# Patient Record
Sex: MALE | Race: WHITE
[De-identification: names, ages, dates, MRNs, and addresses within clinical notes are randomized per-mention and may not be internally consistent; named-entity substitution may affect disease eponyms.]

---

## 2021-06-12 ENCOUNTER — HOSPITAL ENCOUNTER (EMERGENCY)
Dept: HOSPITAL 79 - ER1 | Age: 56
Discharge: HOME | End: 2021-06-12
Payer: MEDICARE

## 2021-06-12 DIAGNOSIS — D72.829: ICD-10-CM

## 2021-06-12 DIAGNOSIS — K80.20: Primary | ICD-10-CM

## 2021-06-12 DIAGNOSIS — Z20.822: ICD-10-CM

## 2021-06-12 LAB
BUN/CREATININE RATIO: 29 (ref 0–10)
HGB BLD-MCNC: 15.6 GM/DL (ref 14–17.5)
RED BLOOD COUNT: 4.97 M/UL (ref 4.2–5.5)
WHITE BLOOD COUNT: 16 K/UL (ref 4.5–11)

## 2021-06-12 PROCEDURE — U0002 COVID-19 LAB TEST NON-CDC: HCPCS

## 2021-08-15 ENCOUNTER — HOSPITAL ENCOUNTER (EMERGENCY)
Dept: HOSPITAL 79 - ER1 | Age: 56
Discharge: LEFT BEFORE DISCHARGE | End: 2021-08-15
Payer: MEDICARE

## 2021-08-15 DIAGNOSIS — Z53.21: Primary | ICD-10-CM

## 2023-03-30 ENCOUNTER — APPOINTMENT (OUTPATIENT)
Dept: CT IMAGING | Facility: HOSPITAL | Age: 58
End: 2023-03-30
Payer: MEDICARE

## 2023-03-30 ENCOUNTER — HOSPITAL ENCOUNTER (EMERGENCY)
Facility: HOSPITAL | Age: 58
Discharge: HOME | End: 2023-03-30
Attending: EMERGENCY MEDICINE | Admitting: EMERGENCY MEDICINE
Payer: MEDICARE

## 2023-03-30 VITALS
BODY MASS INDEX: 27.92 KG/M2 | WEIGHT: 195 LBS | OXYGEN SATURATION: 99 % | HEART RATE: 57 BPM | DIASTOLIC BLOOD PRESSURE: 77 MMHG | SYSTOLIC BLOOD PRESSURE: 111 MMHG | TEMPERATURE: 98.4 F | RESPIRATION RATE: 16 BRPM | HEIGHT: 70 IN

## 2023-03-30 DIAGNOSIS — A08.0 ROTAVIRUS ENTERITIS: Primary | ICD-10-CM

## 2023-03-30 LAB
027 TOXIN: NORMAL
ADV 40+41 DNA STL QL NAA+NON-PROBE: NOT DETECTED
ALBUMIN SERPL-MCNC: 4.2 G/DL (ref 3.5–5.2)
ALBUMIN/GLOB SERPL: 1.3 G/DL
ALP SERPL-CCNC: 65 U/L (ref 39–117)
ALT SERPL W P-5'-P-CCNC: 29 U/L (ref 1–41)
AMYLASE SERPL-CCNC: 20 U/L (ref 28–100)
ANION GAP SERPL CALCULATED.3IONS-SCNC: 12.6 MMOL/L (ref 5–15)
AST SERPL-CCNC: 30 U/L (ref 1–40)
ASTRO TYP 1-8 RNA STL QL NAA+NON-PROBE: NOT DETECTED
BASOPHILS # BLD AUTO: 0.01 10*3/MM3 (ref 0–0.2)
BASOPHILS NFR BLD AUTO: 0.2 % (ref 0–1.5)
BILIRUB SERPL-MCNC: 0.5 MG/DL (ref 0–1.2)
BILIRUB UR QL STRIP: NEGATIVE
BUN SERPL-MCNC: 23 MG/DL (ref 6–20)
BUN/CREAT SERPL: 25 (ref 7–25)
C CAYETANENSIS DNA STL QL NAA+NON-PROBE: NOT DETECTED
C COLI+JEJ+UPSA DNA STL QL NAA+NON-PROBE: NOT DETECTED
C DIFF TOX GENS STL QL NAA+PROBE: NEGATIVE
CALCIUM SPEC-SCNC: 9.1 MG/DL (ref 8.6–10.5)
CHLORIDE SERPL-SCNC: 103 MMOL/L (ref 98–107)
CLARITY UR: CLEAR
CO2 SERPL-SCNC: 19.4 MMOL/L (ref 22–29)
COLOR UR: ABNORMAL
CREAT SERPL-MCNC: 0.92 MG/DL (ref 0.76–1.27)
CRP SERPL-MCNC: 0.85 MG/DL (ref 0–0.5)
CRYPTOSP DNA STL QL NAA+NON-PROBE: NOT DETECTED
DEPRECATED RDW RBC AUTO: 42.4 FL (ref 37–54)
E HISTOLYT DNA STL QL NAA+NON-PROBE: NOT DETECTED
EAEC PAA PLAS AGGR+AATA ST NAA+NON-PRB: NOT DETECTED
EC STX1+STX2 GENES STL QL NAA+NON-PROBE: NOT DETECTED
EGFRCR SERPLBLD CKD-EPI 2021: 97.6 ML/MIN/1.73
EOSINOPHIL # BLD AUTO: 0.07 10*3/MM3 (ref 0–0.4)
EOSINOPHIL NFR BLD AUTO: 1.1 % (ref 0.3–6.2)
EPEC EAE GENE STL QL NAA+NON-PROBE: NOT DETECTED
ERYTHROCYTE [DISTWIDTH] IN BLOOD BY AUTOMATED COUNT: 12.9 % (ref 12.3–15.4)
ERYTHROCYTE [SEDIMENTATION RATE] IN BLOOD: 23 MM/HR (ref 0–20)
ETEC LTA+ST1A+ST1B TOX ST NAA+NON-PROBE: NOT DETECTED
G LAMBLIA DNA STL QL NAA+NON-PROBE: NOT DETECTED
GLOBULIN UR ELPH-MCNC: 3.2 GM/DL
GLUCOSE SERPL-MCNC: 93 MG/DL (ref 65–99)
GLUCOSE UR STRIP-MCNC: NEGATIVE MG/DL
HCT VFR BLD AUTO: 48.7 % (ref 37.5–51)
HGB BLD-MCNC: 16.2 G/DL (ref 13–17.7)
HGB UR QL STRIP.AUTO: NEGATIVE
HOLD SPECIMEN: NORMAL
HOLD SPECIMEN: NORMAL
IMM GRANULOCYTES # BLD AUTO: 0.02 10*3/MM3 (ref 0–0.05)
IMM GRANULOCYTES NFR BLD AUTO: 0.3 % (ref 0–0.5)
KETONES UR QL STRIP: NEGATIVE
LEUKOCYTE ESTERASE UR QL STRIP.AUTO: NEGATIVE
LIPASE SERPL-CCNC: 17 U/L (ref 13–60)
LYMPHOCYTES # BLD AUTO: 0.58 10*3/MM3 (ref 0.7–3.1)
LYMPHOCYTES NFR BLD AUTO: 9.5 % (ref 19.6–45.3)
MAGNESIUM SERPL-MCNC: 2.2 MG/DL (ref 1.6–2.6)
MCH RBC QN AUTO: 29.9 PG (ref 26.6–33)
MCHC RBC AUTO-ENTMCNC: 33.3 G/DL (ref 31.5–35.7)
MCV RBC AUTO: 89.9 FL (ref 79–97)
MONOCYTES # BLD AUTO: 0.73 10*3/MM3 (ref 0.1–0.9)
MONOCYTES NFR BLD AUTO: 11.9 % (ref 5–12)
NEUTROPHILS NFR BLD AUTO: 4.71 10*3/MM3 (ref 1.7–7)
NEUTROPHILS NFR BLD AUTO: 77 % (ref 42.7–76)
NITRITE UR QL STRIP: NEGATIVE
NOROVIRUS GI+II RNA STL QL NAA+NON-PROBE: NOT DETECTED
NRBC BLD AUTO-RTO: 0 /100 WBC (ref 0–0.2)
P SHIGELLOIDES DNA STL QL NAA+NON-PROBE: NOT DETECTED
PH UR STRIP.AUTO: 5.5 [PH] (ref 5–8)
PLATELET # BLD AUTO: 244 10*3/MM3 (ref 140–450)
PMV BLD AUTO: 9.6 FL (ref 6–12)
POTASSIUM SERPL-SCNC: 3.4 MMOL/L (ref 3.5–5.2)
PROCALCITONIN SERPL-MCNC: 0.17 NG/ML (ref 0–0.25)
PROT SERPL-MCNC: 7.4 G/DL (ref 6–8.5)
PROT UR QL STRIP: ABNORMAL
RBC # BLD AUTO: 5.42 10*6/MM3 (ref 4.14–5.8)
RVA RNA STL QL NAA+NON-PROBE: DETECTED
S ENT+BONG DNA STL QL NAA+NON-PROBE: NOT DETECTED
SAPO I+II+IV+V RNA STL QL NAA+NON-PROBE: NOT DETECTED
SHIGELLA SP+EIEC IPAH ST NAA+NON-PROBE: NOT DETECTED
SODIUM SERPL-SCNC: 135 MMOL/L (ref 136–145)
SP GR UR STRIP: 1.03 (ref 1–1.03)
UROBILINOGEN UR QL STRIP: ABNORMAL
V CHOL+PARA+VUL DNA STL QL NAA+NON-PROBE: NOT DETECTED
V CHOLERAE DNA STL QL NAA+NON-PROBE: NOT DETECTED
WBC NRBC COR # BLD: 6.12 10*3/MM3 (ref 3.4–10.8)
WHOLE BLOOD HOLD COAG: NORMAL
WHOLE BLOOD HOLD SPECIMEN: NORMAL
Y ENTEROCOL DNA STL QL NAA+NON-PROBE: NOT DETECTED

## 2023-03-30 PROCEDURE — 74177 CT ABD & PELVIS W/CONTRAST: CPT

## 2023-03-30 PROCEDURE — 86140 C-REACTIVE PROTEIN: CPT | Performed by: PHYSICIAN ASSISTANT

## 2023-03-30 PROCEDURE — 83690 ASSAY OF LIPASE: CPT | Performed by: PHYSICIAN ASSISTANT

## 2023-03-30 PROCEDURE — 82150 ASSAY OF AMYLASE: CPT | Performed by: PHYSICIAN ASSISTANT

## 2023-03-30 PROCEDURE — 85652 RBC SED RATE AUTOMATED: CPT | Performed by: PHYSICIAN ASSISTANT

## 2023-03-30 PROCEDURE — 87493 C DIFF AMPLIFIED PROBE: CPT | Performed by: PHYSICIAN ASSISTANT

## 2023-03-30 PROCEDURE — 84145 PROCALCITONIN (PCT): CPT | Performed by: PHYSICIAN ASSISTANT

## 2023-03-30 PROCEDURE — 25510000001 IOPAMIDOL 61 % SOLUTION: Performed by: EMERGENCY MEDICINE

## 2023-03-30 PROCEDURE — 81003 URINALYSIS AUTO W/O SCOPE: CPT | Performed by: PHYSICIAN ASSISTANT

## 2023-03-30 PROCEDURE — 87507 IADNA-DNA/RNA PROBE TQ 12-25: CPT | Performed by: PHYSICIAN ASSISTANT

## 2023-03-30 PROCEDURE — 83735 ASSAY OF MAGNESIUM: CPT | Performed by: PHYSICIAN ASSISTANT

## 2023-03-30 PROCEDURE — 96360 HYDRATION IV INFUSION INIT: CPT

## 2023-03-30 PROCEDURE — 85025 COMPLETE CBC W/AUTO DIFF WBC: CPT | Performed by: PHYSICIAN ASSISTANT

## 2023-03-30 PROCEDURE — 80053 COMPREHEN METABOLIC PANEL: CPT | Performed by: PHYSICIAN ASSISTANT

## 2023-03-30 PROCEDURE — 99284 EMERGENCY DEPT VISIT MOD MDM: CPT

## 2023-03-30 RX ORDER — POTASSIUM CHLORIDE 20 MEQ/1
40 TABLET, EXTENDED RELEASE ORAL ONCE
Status: COMPLETED | OUTPATIENT
Start: 2023-03-30 | End: 2023-03-30

## 2023-03-30 RX ORDER — SODIUM CHLORIDE 0.9 % (FLUSH) 0.9 %
10 SYRINGE (ML) INJECTION AS NEEDED
Status: DISCONTINUED | OUTPATIENT
Start: 2023-03-30 | End: 2023-03-30 | Stop reason: HOSPADM

## 2023-03-30 RX ADMIN — IOPAMIDOL 84 ML: 612 INJECTION, SOLUTION INTRAVENOUS at 17:37

## 2023-03-30 RX ADMIN — SODIUM CHLORIDE 2000 ML: 9 INJECTION, SOLUTION INTRAVENOUS at 15:18

## 2023-03-30 RX ADMIN — POTASSIUM CHLORIDE 40 MEQ: 20 TABLET, EXTENDED RELEASE ORAL at 17:06

## 2023-03-30 NOTE — Clinical Note
[facility] EMERGENCY DEPARTMENT  [address] [facility] [address]  Phone: [phone]    Macario Shukla was seen and treated in our emergency department on 3/30/2023.  He may return to work on 04/03/2023.         Thank you for choosing [facility].    Marge Lindsey, PA

## 2023-03-30 NOTE — DISCHARGE INSTRUCTIONS
Rest at home, make sure you are drinking plenty of fluids.  Follow the brat (bananas, rice, applesauce, toast) diet to help with diarrhea.  Return to the ED at any time if symptoms change or worsen

## 2023-03-30 NOTE — ED PROVIDER NOTES
Subjective   History of Present Illness  58-year-old male who presents to the ED today for nausea, vomiting and diarrhea.  His symptoms started about 3 days ago.  He states the vomiting is better but he is still having a lot of diarrhea.  He states he has tried Pedialyte and Gatorade but everything is going straight through him.  He states he feels dehydrated.  He has been using Zofran for the nausea and it is helping.  He denies any recent travel.  He denies any known sick contacts.  He denies any exposure to contaminated food or water.  He states he is having lower abdominal cramping and chills but denies any known fever.  He states he is typically healthy and does not take any daily medications.    History provided by:  Patient  Vomiting  The primary symptoms include abdominal pain, nausea, vomiting and diarrhea. Primary symptoms do not include fever. The illness began 3 to 5 days ago. The onset was sudden. The problem has not changed since onset.  The illness is also significant for chills and anorexia.       Review of Systems   Constitutional: Positive for appetite change and chills. Negative for fever.   HENT: Negative.    Eyes: Negative.    Respiratory: Negative.    Cardiovascular: Negative.    Gastrointestinal: Positive for abdominal pain, anorexia, diarrhea, nausea and vomiting.   Genitourinary: Negative.    Musculoskeletal: Negative.    Skin: Negative.    Neurological: Negative.    Psychiatric/Behavioral: Negative.    All other systems reviewed and are negative.      No past medical history on file.    No Known Allergies    No past surgical history on file.    No family history on file.    Social History     Socioeconomic History   • Marital status:            Objective   Physical Exam  Vitals and nursing note reviewed.   Constitutional:       General: He is not in acute distress.     Appearance: He is well-developed. He is not diaphoretic.   HENT:      Head: Normocephalic and atraumatic.   Eyes:       Extraocular Movements: Extraocular movements intact.      Pupils: Pupils are equal, round, and reactive to light.   Cardiovascular:      Rate and Rhythm: Normal rate and regular rhythm.      Heart sounds: Normal heart sounds.   Pulmonary:      Effort: Pulmonary effort is normal.      Breath sounds: Normal breath sounds.   Abdominal:      General: Bowel sounds are increased.      Palpations: Abdomen is soft.      Tenderness: There is no abdominal tenderness. There is no right CVA tenderness, left CVA tenderness, guarding or rebound.   Skin:     General: Skin is warm and dry.      Capillary Refill: Capillary refill takes less than 2 seconds.   Neurological:      General: No focal deficit present.      Mental Status: He is alert and oriented to person, place, and time.   Psychiatric:         Mood and Affect: Mood normal.         Procedures        Results for orders placed or performed during the hospital encounter of 03/30/23   Gastrointestinal Panel, PCR - Stool, Per Rectum    Specimen: Per Rectum; Stool   Result Value Ref Range    Campylobacter Not Detected Not Detected    Plesiomonas shigelloides Not Detected Not Detected    Salmonella Not Detected Not Detected    Vibrio Not Detected Not Detected    Vibrio cholerae Not Detected Not Detected    Yersinia enterocolitica Not Detected Not Detected    Enteroaggregative E. coli (EAEC) Not Detected Not Detected    Enteropathogenic E. coli (EPEC) Not Detected Not Detected    Enterotoxigenic E. coli (ETEC) lt/st Not Detected Not Detected    Shiga-like toxin-producing E. coli (STEC) stx1/stx2 Not Detected Not Detected    Shigella/Enteroinvasive E. coli (EIEC) Not Detected Not Detected    Cryptosporidium Not Detected Not Detected    Cyclospora cayetanensis Not Detected Not Detected    Entamoeba histolytica Not Detected Not Detected    Giardia lamblia Not Detected Not Detected    Adenovirus F40/41 Not Detected Not Detected    Astrovirus Not Detected Not Detected    Norovirus  GI/GII Not Detected Not Detected    Rotavirus A Detected (A) Not Detected    Sapovirus (I, II, IV or V) Not Detected Not Detected   Clostridioides difficile Toxin, PCR - Stool, Per Rectum    Specimen: Per Rectum; Stool   Result Value Ref Range    C. Difficile Toxins by PCR Negative Negative    027 Toxin Presumptive Negative    Comprehensive Metabolic Panel    Specimen: Blood   Result Value Ref Range    Glucose 93 65 - 99 mg/dL    BUN 23 (H) 6 - 20 mg/dL    Creatinine 0.92 0.76 - 1.27 mg/dL    Sodium 135 (L) 136 - 145 mmol/L    Potassium 3.4 (L) 3.5 - 5.2 mmol/L    Chloride 103 98 - 107 mmol/L    CO2 19.4 (L) 22.0 - 29.0 mmol/L    Calcium 9.1 8.6 - 10.5 mg/dL    Total Protein 7.4 6.0 - 8.5 g/dL    Albumin 4.2 3.5 - 5.2 g/dL    ALT (SGPT) 29 1 - 41 U/L    AST (SGOT) 30 1 - 40 U/L    Alkaline Phosphatase 65 39 - 117 U/L    Total Bilirubin 0.5 0.0 - 1.2 mg/dL    Globulin 3.2 gm/dL    A/G Ratio 1.3 g/dL    BUN/Creatinine Ratio 25.0 7.0 - 25.0    Anion Gap 12.6 5.0 - 15.0 mmol/L    eGFR 97.6 >60.0 mL/min/1.73   Lipase    Specimen: Blood   Result Value Ref Range    Lipase 17 13 - 60 U/L   Amylase    Specimen: Blood   Result Value Ref Range    Amylase 20 (L) 28 - 100 U/L   Urinalysis With Microscopic If Indicated (No Culture) - Urine, Clean Catch    Specimen: Urine, Clean Catch   Result Value Ref Range    Color, UA Dark Yellow (A) Yellow, Straw    Appearance, UA Clear Clear    pH, UA 5.5 5.0 - 8.0    Specific Gravity, UA 1.029 1.005 - 1.030    Glucose, UA Negative Negative    Ketones, UA Negative Negative    Bilirubin, UA Negative Negative    Blood, UA Negative Negative    Protein, UA Trace (A) Negative    Leuk Esterase, UA Negative Negative    Nitrite, UA Negative Negative    Urobilinogen, UA 0.2 E.U./dL 0.2 - 1.0 E.U./dL   Sedimentation Rate    Specimen: Blood   Result Value Ref Range    Sed Rate 23 (H) 0 - 20 mm/hr   C-reactive Protein    Specimen: Blood   Result Value Ref Range    C-Reactive Protein 0.85 (H) 0.00 - 0.50  mg/dL   Procalcitonin    Specimen: Blood   Result Value Ref Range    Procalcitonin 0.17 0.00 - 0.25 ng/mL   Magnesium    Specimen: Blood   Result Value Ref Range    Magnesium 2.2 1.6 - 2.6 mg/dL   CBC Auto Differential    Specimen: Blood   Result Value Ref Range    WBC 6.12 3.40 - 10.80 10*3/mm3    RBC 5.42 4.14 - 5.80 10*6/mm3    Hemoglobin 16.2 13.0 - 17.7 g/dL    Hematocrit 48.7 37.5 - 51.0 %    MCV 89.9 79.0 - 97.0 fL    MCH 29.9 26.6 - 33.0 pg    MCHC 33.3 31.5 - 35.7 g/dL    RDW 12.9 12.3 - 15.4 %    RDW-SD 42.4 37.0 - 54.0 fl    MPV 9.6 6.0 - 12.0 fL    Platelets 244 140 - 450 10*3/mm3    Neutrophil % 77.0 (H) 42.7 - 76.0 %    Lymphocyte % 9.5 (L) 19.6 - 45.3 %    Monocyte % 11.9 5.0 - 12.0 %    Eosinophil % 1.1 0.3 - 6.2 %    Basophil % 0.2 0.0 - 1.5 %    Immature Grans % 0.3 0.0 - 0.5 %    Neutrophils, Absolute 4.71 1.70 - 7.00 10*3/mm3    Lymphocytes, Absolute 0.58 (L) 0.70 - 3.10 10*3/mm3    Monocytes, Absolute 0.73 0.10 - 0.90 10*3/mm3    Eosinophils, Absolute 0.07 0.00 - 0.40 10*3/mm3    Basophils, Absolute 0.01 0.00 - 0.20 10*3/mm3    Immature Grans, Absolute 0.02 0.00 - 0.05 10*3/mm3    nRBC 0.0 0.0 - 0.2 /100 WBC   Green Top (Gel)   Result Value Ref Range    Extra Tube Hold for add-ons.    Lavender Top   Result Value Ref Range    Extra Tube hold for add-on    Gold Top - SST   Result Value Ref Range    Extra Tube Hold for add-ons.    Light Blue Top   Result Value Ref Range    Extra Tube Hold for add-ons.          ED Course  ED Course as of 03/30/23 1759   Thu Mar 30, 2023   1616 Clostridium difficile (toxin A/B): Negative []   1655 Rotavirus A(!): Detected []   1750 CT Abdomen Pelvis With Contrast  IMPRESSION:     1.  Liquid stool throughout the small bowel and colon which can be seen in the setting of diarrheal illness. No bowel wall thickening or adjacent inflammatory changes.  2.  Cholelithiasis. []      ED Course User Index  [] Marge Lindsey, PA                                            Medical Decision Making  58-year-old male who presents to the ED today for vomiting and diarrhea.  This has been going on for about 3 days.  He tested positive for rotavirus in the ED today.  He tested negative for C. difficile.  CT of the abdomen pelvis was consistent with a diarrheal illness.  Patient received IV fluids.  He was advised on the brat diet to help with the diarrhea and to follow-up as an outpatient.  He will return to the ED if symptoms change or worsen.    Rotavirus enteritis: complicated acute illness or injury  Amount and/or Complexity of Data Reviewed  Labs: ordered. Decision-making details documented in ED Course.  Radiology: ordered. Decision-making details documented in ED Course.      Risk  Prescription drug management.          Final diagnoses:   Rotavirus enteritis       ED Disposition  ED Disposition     ED Disposition   Discharge    Condition   Stable    Comment   --             PATIENT CONNECTION - [facility]  See Provider List  [place] [address]  [phone]  Schedule an appointment as soon as possible for a visit in 1 day           Medication List      No changes were made to your prescriptions during this visit.          Marge Lindsey PA  03/30/23 0679

## 2023-06-05 ENCOUNTER — HOSPITAL ENCOUNTER (EMERGENCY)
Facility: HOSPITAL | Age: 58
Discharge: HOME | End: 2023-06-05
Attending: STUDENT IN AN ORGANIZED HEALTH CARE EDUCATION/TRAINING PROGRAM
Payer: MEDICARE

## 2023-06-05 ENCOUNTER — APPOINTMENT (OUTPATIENT)
Dept: CT IMAGING | Facility: HOSPITAL | Age: 58
End: 2023-06-05
Payer: MEDICARE

## 2023-06-05 ENCOUNTER — APPOINTMENT (OUTPATIENT)
Dept: ULTRASOUND IMAGING | Facility: HOSPITAL | Age: 58
End: 2023-06-05
Payer: MEDICARE

## 2023-06-05 VITALS
BODY MASS INDEX: 29.35 KG/M2 | SYSTOLIC BLOOD PRESSURE: 142 MMHG | DIASTOLIC BLOOD PRESSURE: 73 MMHG | WEIGHT: 205 LBS | OXYGEN SATURATION: 98 % | HEART RATE: 56 BPM | HEIGHT: 70 IN | RESPIRATION RATE: 18 BRPM | TEMPERATURE: 97.9 F

## 2023-06-05 DIAGNOSIS — K80.80 BILIARY CALCULUS OF OTHER SITE WITHOUT OBSTRUCTION: Primary | ICD-10-CM

## 2023-06-05 LAB
ALBUMIN SERPL-MCNC: 4.2 G/DL (ref 3.5–5.2)
ALBUMIN/GLOB SERPL: 1.8 G/DL
ALP SERPL-CCNC: 67 U/L (ref 39–117)
ALT SERPL W P-5'-P-CCNC: 16 U/L (ref 1–41)
ANION GAP SERPL CALCULATED.3IONS-SCNC: 9.7 MMOL/L (ref 5–15)
AST SERPL-CCNC: 18 U/L (ref 1–40)
BASOPHILS # BLD AUTO: 0.02 10*3/MM3 (ref 0–0.2)
BASOPHILS NFR BLD AUTO: 0.3 % (ref 0–1.5)
BILIRUB SERPL-MCNC: 0.5 MG/DL (ref 0–1.2)
BILIRUB UR QL STRIP: NEGATIVE
BUN SERPL-MCNC: 12 MG/DL (ref 6–20)
BUN/CREAT SERPL: 12.9 (ref 7–25)
CALCIUM SPEC-SCNC: 9.2 MG/DL (ref 8.6–10.5)
CHLORIDE SERPL-SCNC: 108 MMOL/L (ref 98–107)
CLARITY UR: CLEAR
CO2 SERPL-SCNC: 26.3 MMOL/L (ref 22–29)
COLOR UR: YELLOW
CREAT SERPL-MCNC: 0.93 MG/DL (ref 0.76–1.27)
DEPRECATED RDW RBC AUTO: 43.4 FL (ref 37–54)
EGFRCR SERPLBLD CKD-EPI 2021: 96.4 ML/MIN/1.73
EOSINOPHIL # BLD AUTO: 0.12 10*3/MM3 (ref 0–0.4)
EOSINOPHIL NFR BLD AUTO: 2 % (ref 0.3–6.2)
ERYTHROCYTE [DISTWIDTH] IN BLOOD BY AUTOMATED COUNT: 12.6 % (ref 12.3–15.4)
GLOBULIN UR ELPH-MCNC: 2.4 GM/DL
GLUCOSE SERPL-MCNC: 129 MG/DL (ref 65–99)
GLUCOSE UR STRIP-MCNC: NEGATIVE MG/DL
HCT VFR BLD AUTO: 40.3 % (ref 37.5–51)
HGB BLD-MCNC: 13.3 G/DL (ref 13–17.7)
HGB UR QL STRIP.AUTO: NEGATIVE
IMM GRANULOCYTES # BLD AUTO: 0.01 10*3/MM3 (ref 0–0.05)
IMM GRANULOCYTES NFR BLD AUTO: 0.2 % (ref 0–0.5)
KETONES UR QL STRIP: NEGATIVE
LEUKOCYTE ESTERASE UR QL STRIP.AUTO: NEGATIVE
LIPASE SERPL-CCNC: 26 U/L (ref 13–60)
LYMPHOCYTES # BLD AUTO: 1.87 10*3/MM3 (ref 0.7–3.1)
LYMPHOCYTES NFR BLD AUTO: 30.5 % (ref 19.6–45.3)
MCH RBC QN AUTO: 30.7 PG (ref 26.6–33)
MCHC RBC AUTO-ENTMCNC: 33 G/DL (ref 31.5–35.7)
MCV RBC AUTO: 93.1 FL (ref 79–97)
MONOCYTES # BLD AUTO: 0.67 10*3/MM3 (ref 0.1–0.9)
MONOCYTES NFR BLD AUTO: 10.9 % (ref 5–12)
NEUTROPHILS NFR BLD AUTO: 3.44 10*3/MM3 (ref 1.7–7)
NEUTROPHILS NFR BLD AUTO: 56.1 % (ref 42.7–76)
NITRITE UR QL STRIP: NEGATIVE
NRBC BLD AUTO-RTO: 0 /100 WBC (ref 0–0.2)
PH UR STRIP.AUTO: 6 [PH] (ref 5–8)
PLATELET # BLD AUTO: 206 10*3/MM3 (ref 140–450)
PMV BLD AUTO: 9.7 FL (ref 6–12)
POTASSIUM SERPL-SCNC: 3.9 MMOL/L (ref 3.5–5.2)
PROT SERPL-MCNC: 6.6 G/DL (ref 6–8.5)
PROT UR QL STRIP: NEGATIVE
RBC # BLD AUTO: 4.33 10*6/MM3 (ref 4.14–5.8)
SODIUM SERPL-SCNC: 144 MMOL/L (ref 136–145)
SP GR UR STRIP: >1.03 (ref 1–1.03)
UROBILINOGEN UR QL STRIP: ABNORMAL
WBC NRBC COR # BLD: 6.13 10*3/MM3 (ref 3.4–10.8)

## 2023-06-05 PROCEDURE — 74177 CT ABD & PELVIS W/CONTRAST: CPT | Performed by: RADIOLOGY

## 2023-06-05 PROCEDURE — 96375 TX/PRO/DX INJ NEW DRUG ADDON: CPT

## 2023-06-05 PROCEDURE — 25510000001 IOPAMIDOL 61 % SOLUTION: Performed by: STUDENT IN AN ORGANIZED HEALTH CARE EDUCATION/TRAINING PROGRAM

## 2023-06-05 PROCEDURE — 85025 COMPLETE CBC W/AUTO DIFF WBC: CPT | Performed by: STUDENT IN AN ORGANIZED HEALTH CARE EDUCATION/TRAINING PROGRAM

## 2023-06-05 PROCEDURE — 99283 EMERGENCY DEPT VISIT LOW MDM: CPT

## 2023-06-05 PROCEDURE — 25010000002 MORPHINE PER 10 MG: Performed by: STUDENT IN AN ORGANIZED HEALTH CARE EDUCATION/TRAINING PROGRAM

## 2023-06-05 PROCEDURE — 80053 COMPREHEN METABOLIC PANEL: CPT | Performed by: STUDENT IN AN ORGANIZED HEALTH CARE EDUCATION/TRAINING PROGRAM

## 2023-06-05 PROCEDURE — 83690 ASSAY OF LIPASE: CPT | Performed by: STUDENT IN AN ORGANIZED HEALTH CARE EDUCATION/TRAINING PROGRAM

## 2023-06-05 PROCEDURE — 76705 ECHO EXAM OF ABDOMEN: CPT

## 2023-06-05 PROCEDURE — 81003 URINALYSIS AUTO W/O SCOPE: CPT | Performed by: STUDENT IN AN ORGANIZED HEALTH CARE EDUCATION/TRAINING PROGRAM

## 2023-06-05 PROCEDURE — 25010000002 KETOROLAC TROMETHAMINE PER 15 MG: Performed by: STUDENT IN AN ORGANIZED HEALTH CARE EDUCATION/TRAINING PROGRAM

## 2023-06-05 PROCEDURE — 74177 CT ABD & PELVIS W/CONTRAST: CPT

## 2023-06-05 PROCEDURE — 76705 ECHO EXAM OF ABDOMEN: CPT | Performed by: RADIOLOGY

## 2023-06-05 PROCEDURE — 96374 THER/PROPH/DIAG INJ IV PUSH: CPT

## 2023-06-05 PROCEDURE — 36415 COLL VENOUS BLD VENIPUNCTURE: CPT

## 2023-06-05 RX ORDER — KETOROLAC TROMETHAMINE 10 MG/1
10 TABLET, FILM COATED ORAL EVERY 6 HOURS PRN
Qty: 20 TABLET | Refills: 0 | Status: SHIPPED | OUTPATIENT
Start: 2023-06-05

## 2023-06-05 RX ORDER — ONDANSETRON 4 MG/1
4 TABLET, ORALLY DISINTEGRATING ORAL EVERY 8 HOURS PRN
Qty: 20 TABLET | Refills: 0 | Status: SHIPPED | OUTPATIENT
Start: 2023-06-05

## 2023-06-05 RX ORDER — KETOROLAC TROMETHAMINE 30 MG/ML
30 INJECTION, SOLUTION INTRAMUSCULAR; INTRAVENOUS ONCE
Status: COMPLETED | OUTPATIENT
Start: 2023-06-05 | End: 2023-06-05

## 2023-06-05 RX ORDER — HYDROCODONE BITARTRATE AND ACETAMINOPHEN 5; 325 MG/1; MG/1
1 TABLET ORAL EVERY 8 HOURS PRN
Qty: 12 TABLET | Refills: 0 | Status: SHIPPED | OUTPATIENT
Start: 2023-06-05

## 2023-06-05 RX ADMIN — MORPHINE SULFATE 4 MG: 4 INJECTION, SOLUTION INTRAMUSCULAR; INTRAVENOUS at 05:07

## 2023-06-05 RX ADMIN — KETOROLAC TROMETHAMINE 30 MG: 30 INJECTION, SOLUTION INTRAMUSCULAR; INTRAVENOUS at 03:03

## 2023-06-05 RX ADMIN — IOPAMIDOL 80 ML: 612 INJECTION, SOLUTION INTRAVENOUS at 03:57

## 2023-06-05 RX ADMIN — SODIUM CHLORIDE 1000 ML: 9 INJECTION, SOLUTION INTRAVENOUS at 03:02

## 2023-06-05 NOTE — Clinical Note
[facility] EMERGENCY DEPARTMENT  [address] [facility] [address]  Phone: [phone]    Macario Shukla was seen and treated in our emergency department on 6/5/2023.  He may return to work on 06/06/2023.         Thank you for choosing [facility].    Maya Bledsoe MD

## 2023-06-05 NOTE — Clinical Note
[facility] EMERGENCY DEPARTMENT  [address] [facility] [address]  Phone: [phone]    Macario Shukla was seen and treated in our emergency department on 6/5/2023.  He may return to work on 06/06/2023.         Thank you for choosing [facility].    Maya Bledsoe MD

## 2023-06-05 NOTE — DISCHARGE INSTRUCTIONS
Please call Dr. Liz/Surgery to set up appointment in 2-3 days to discuss elective cholecystectomy. Please take pain meds as prescribed. Follow up with your primary care physician as well. Return if any other worsening symptoms are concerns.

## 2023-06-12 NOTE — ED PROVIDER NOTES
Subjective   History of Present Illness    Macario is a 57 yo presenting to the ED for right sided abdominal pain. Patient reports flank pain with radiation to the middle of spine. Symptom onset last night. Also reports bloating in her stomach. No vomiting or diarrhea. No recent trauma.     Review of Systems   Constitutional: Negative.  Negative for fever.   HENT: Negative.     Respiratory: Negative.     Cardiovascular: Negative.  Negative for chest pain.   Gastrointestinal:  Positive for abdominal pain (right sided abdominal pain).   Endocrine: Negative.    Genitourinary: Negative.  Negative for dysuria.   Skin: Negative.    Neurological: Negative.    Psychiatric/Behavioral: Negative.     All other systems reviewed and are negative.    No past medical history on file.    No Known Allergies    No past surgical history on file.    No family history on file.    Social History     Socioeconomic History    Marital status:            Objective   Physical Exam  Constitutional:       General: He is not in acute distress.     Appearance: Normal appearance. He is not ill-appearing.   HENT:      Head: Normocephalic and atraumatic.      Nose: No congestion or rhinorrhea.   Eyes:      Extraocular Movements: Extraocular movements intact.      Pupils: Pupils are equal, round, and reactive to light.   Cardiovascular:      Rate and Rhythm: Normal rate and regular rhythm.   Pulmonary:      Effort: Pulmonary effort is normal.      Breath sounds: Normal breath sounds.   Abdominal:      General: Bowel sounds are normal.      Palpations: Abdomen is soft.   Musculoskeletal:         General: Normal range of motion.      Cervical back: Normal range of motion.   Skin:     General: Skin is warm.      Capillary Refill: Capillary refill takes less than 2 seconds.   Neurological:      General: No focal deficit present.      Mental Status: He is alert and oriented to person, place, and time.      Cranial Nerves: No cranial nerve deficit.       Sensory: No sensory deficit.      Motor: No weakness.      Deep Tendon Reflexes: Reflexes normal.       Procedures           ED Course                                           Medical Decision Making  Macario is a 59 yo presenting to the Ed for right sided abdominal pain. Patient is afebrile and hemodynamically st bale. Patient is non toxic appearing. Abdominal exam is non peritonitic. US shows cholelithiasis, no evidence of cholecystitis. Basic labs, UA are non actionable. Normal bilirubin and  normal liver enzymes. Patient given close follow up with Dr. Liz/Surgery given where stones are located. Patient discharged in stable condition w/ close fu w/ surgery and strict return precautions.     Problems Addressed:  Biliary calculus of other site without obstruction: complicated acute illness or injury    Amount and/or Complexity of Data Reviewed  Labs: ordered.  Radiology: ordered.    Risk  Prescription drug management.        Final diagnoses:   Biliary calculus of other site without obstruction       ED Disposition  ED Disposition       ED Disposition   Discharge    Condition   Stable    Comment   --               Miguel Liz MD  [address] [place] [address]  [phone]    Go in 2 days  Call Dr. Liz in 2-3 days         Medication List        New Prescriptions      HYDROcodone-acetaminophen 5-325 MG per tablet  Commonly known as: NORCO  Take 1 tablet by mouth Every 8 (Eight) Hours As Needed for Severe Pain.     ketorolac 10 MG tablet  Commonly known as: TORADOL  Take 1 tablet by mouth Every 6 (Six) Hours As Needed for Moderate Pain.     ondansetron ODT 4 MG disintegrating tablet  Commonly known as: ZOFRAN-ODT  Place 1 tablet on the tongue Every 8 (Eight) Hours As Needed for Nausea or Vomiting.               Where to Get Your Medications        These medications were sent to [facility], [place] [address] [address] - [phone] [facility] [phone] [facility] [address],  [facility] [address]      Phone: [phone]   HYDROcodone-acetaminophen 5-325 MG per tablet  ketorolac 10 MG tablet  ondansetron ODT 4 MG disintegrating tablet            Maya Bledsoe MD  06/12/23 6703

## 2023-07-27 ENCOUNTER — OFFICE VISIT (OUTPATIENT)
Dept: ORTHOPEDIC SURGERY | Facility: CLINIC | Age: 58
End: 2023-07-27
Payer: MEDICARE

## 2023-07-27 VITALS
SYSTOLIC BLOOD PRESSURE: 104 MMHG | HEIGHT: 70 IN | HEART RATE: 66 BPM | DIASTOLIC BLOOD PRESSURE: 69 MMHG | WEIGHT: 206 LBS | BODY MASS INDEX: 29.49 KG/M2

## 2023-07-27 DIAGNOSIS — M54.2 CERVICALGIA: Primary | ICD-10-CM

## 2023-07-27 DIAGNOSIS — R20.0 BILATERAL HAND NUMBNESS: ICD-10-CM

## 2023-07-27 PROCEDURE — 99203 OFFICE O/P NEW LOW 30 MIN: CPT | Performed by: PHYSICIAN ASSISTANT

## 2023-07-27 RX ORDER — GABAPENTIN 300 MG/1
300 CAPSULE ORAL 3 TIMES DAILY
COMMUNITY
Start: 2023-07-11

## 2023-07-27 RX ORDER — METHYLPREDNISOLONE 4 MG/1
TABLET ORAL
Qty: 21 TABLET | Refills: 0 | Status: SHIPPED | OUTPATIENT
Start: 2023-07-27

## 2023-07-27 RX ORDER — MELOXICAM 15 MG/1
15 TABLET ORAL DAILY
COMMUNITY
Start: 2012-09-05

## 2023-07-27 NOTE — PROGRESS NOTES
[facility] Orthopaedic Surgery New Patient Visit          Patient: Macario Shukla  YOB: 1965  Date of Encounter: 07/27/2023  PCP: Brooks Bledsoe PA-C      Subjective     Chief Complaint   Patient presents with    Right Hand - Pain    Left Hand - Pain           History of Present Illness:     Macario Shukla is a 58 y.o. male presents today as result of bilateral hand pain and weakness and stiffness with drawing as well as longstanding basicervical region neck pain.  Patient states that approximately 20 years ago he received a carpal tunnel release into the right wrist that was never beneficial.  He reports stiffness with any attempted repetitive motion in the neck causing dull throbbing aching pain down the bilateral extremities progressing into the bilateral hands.  Patient has undergone conservative treatment options with no alleviation of pain symptoms.  He has a remote history of a lumbar spinal fusion and reports of right knee pain intermittent in nature.        There is no problem list on file for this patient.    History reviewed. No pertinent past medical history.  Past Surgical History:   Procedure Laterality Date    CARPAL TUNNEL RELEASE Right     SPINAL FUSION       Social History     Occupational History    Not on file   Tobacco Use    Smoking status: Never    Smokeless tobacco: Never   Substance and Sexual Activity    Alcohol use: Never    Drug use: Never    Sexual activity: Defer    Macario Shukla  reports that he has never smoked. He has never used smokeless tobacco.. I have educated him on the risk of diseases from using tobacco products such as cancer, COPD, and heart disease.          Social History     Social History Narrative    Not on file     Family History   Problem Relation Age of Onset    Cancer Mother     Cancer Father      Current Outpatient Medications   Medication Sig Dispense Refill    gabapentin (NEURONTIN) 300 MG capsule Take 1 capsule by mouth 3 (Three) Times a Day.    "   HYDROcodone-acetaminophen (NORCO) 5-325 MG per tablet Take 1 tablet by mouth Every 8 (Eight) Hours As Needed for Severe Pain. 12 tablet 0    meloxicam (MOBIC) 15 MG tablet Take 1 tablet by mouth Daily.      ondansetron ODT (ZOFRAN-ODT) 4 MG disintegrating tablet Place 1 tablet on the tongue Every 8 (Eight) Hours As Needed for Nausea or Vomiting. 20 tablet 0    ketorolac (TORADOL) 10 MG tablet Take 1 tablet by mouth Every 6 (Six) Hours As Needed for Moderate Pain. (Patient not taking: Reported on 7/27/2023) 20 tablet 0    methylPREDNISolone (MEDROL) 4 MG dose pack Use as directed by package instructions 21 tablet 0     No current facility-administered medications for this visit.     No Known Allergies         Review of Systems   Constitutional: Negative.   HENT: Negative.     Eyes: Negative.    Cardiovascular: Negative.    Respiratory: Negative.     Endocrine: Negative.    Hematologic/Lymphatic: Negative.    Skin: Negative.    Musculoskeletal:         Pertinent positives listed in HPI   Gastrointestinal: Negative.    Genitourinary: Negative.    Neurological: Negative.    Psychiatric/Behavioral: Negative.     Allergic/Immunologic: Negative.        Objective      Vitals:    07/27/23 0811   BP: 104/69   BP Location: Left arm   Patient Position: Sitting   Cuff Size: Adult   Pulse: 66   Weight: 93.4 kg (206 lb)   Height: 177.8 cm (70\")      BMI is >= 25 and <30. (Overweight) The following options were offered after discussion;: exercise counseling/recommendations and nutrition counseling/recommendations      Physical Exam  Vitals and nursing note reviewed.   Constitutional:       General: He is not in acute distress.     Appearance: Normal appearance. He is not ill-appearing.   HENT:      Head: Normocephalic and atraumatic.      Right Ear: External ear normal.      Left Ear: External ear normal.      Nose: Nose normal.      Mouth/Throat:      Mouth: Mucous membranes are moist.      Pharynx: Oropharynx is clear.   Eyes: "      Extraocular Movements: Extraocular movements intact.      Conjunctiva/sclera: Conjunctivae normal.      Pupils: Pupils are equal, round, and reactive to light.   Cardiovascular:      Rate and Rhythm: Normal rate.      Pulses: Normal pulses.   Pulmonary:      Effort: Pulmonary effort is normal.   Abdominal:      General: There is no distension.   Musculoskeletal:      Right hand: Tenderness and bony tenderness present. No swelling or deformity. Decreased range of motion (DIP stiffness). Decreased strength of finger abduction. Decreased sensation of the median distribution and radial distribution. There is no disruption of two-point discrimination. Normal capillary refill. Normal pulse.      Left hand: Tenderness and bony tenderness present. No swelling or deformity. Decreased range of motion (DIP stiffness). Decreased strength of finger abduction. Decreased sensation of the median distribution and radial distribution. There is no disruption of two-point discrimination. Normal capillary refill. Abnormal pulse.      Cervical back: Normal range of motion. Spasms, tenderness, bony tenderness and crepitus present. No swelling, deformity, signs of trauma or rigidity. Pain with movement present. Decreased range of motion.      Comments: Loss of cervical lordosis   Skin:     General: Skin is warm and dry.      Capillary Refill: Capillary refill takes less than 2 seconds.   Neurological:      General: No focal deficit present.      Mental Status: He is alert and oriented to person, place, and time.   Psychiatric:         Mood and Affect: Mood normal.         Behavior: Behavior normal.               Radiology:      XR Spine Cervical Complete 4 or 5 View    Result Date: 7/16/2023  Impression:  No acute bony abnormality. .  This report was finalized on 7/16/2023 11:06 AM by Dr. Soy Trimble MD.      XR Knee 3 View Right    Result Date: 7/16/2023    No acute findings in the right knee.  This report was finalized on  7/16/2023 11:45 AM by Dr. Soy Trimble MD.      XR Hand 3+ View Bilateral    Result Date: 7/16/2023  1.  Joint space narrowing, particularly in the distal interphalangeal joints. 2.  No acute fracture or dislocation.  This report was finalized on 7/16/2023 12:00 PM by Dr. Soy Trimble MD.      XR Spine Lumbar Complete 4+VW    Result Date: 7/16/2023   1. Postoperative change from fusion. 2. No acute lumbar abnormality.  This report was finalized on 7/16/2023 11:06 AM by Dr. Soy Trimble MD.             Assessment/Plan        ICD-10-CM ICD-9-CM   1. Cervicalgia  M54.2 723.1   2. Bilateral hand numbness  R20.0 782.0       58-year-old male with notable longstanding basicervical region neck pain and symptoms progressing down into bilateral upper extremities with numbness and tingling and weakness.  She reports drawing of the hands at times.  He has been recalcitrant to the remote history of right carpal tunnel release.  Patient has not received an MRI or recent EMG/nerve conduction studies.  As result of this the patient was provided with an order for EMG/nerve conduction studies bilateral upper extremities.  In an effort to reduce pain symptoms the patient was provided today with a Medrol Dosepak to be taken as directed.  The patient will return back upon the EMG/nerve conduction studies for further evaluation.  We discussed possibility of further diagnostic imaging with MRI imaging of the cervical spine as this seems to be the most likely source behind patient's pain and symptoms.  We will continue to evaluate and monitor closely.                      This document was signed by Giles Rolon PA-C July 27, 2023     CC: Brooks Bledsoe PA-C      Dictated Utilizing Dragon Dictation:   Please note that portions of this note were completed with a voice recognition program.   Part of this note may be an electronic transcription/translation of spoken language to printed text using the Dragon Dictation System.

## 2023-08-18 ENCOUNTER — CONSULT (OUTPATIENT)
Dept: ONCOLOGY | Facility: CLINIC | Age: 58
End: 2023-08-18
Payer: MEDICARE

## 2023-08-18 VITALS
DIASTOLIC BLOOD PRESSURE: 56 MMHG | WEIGHT: 220.8 LBS | RESPIRATION RATE: 18 BRPM | HEART RATE: 63 BPM | BODY MASS INDEX: 31.61 KG/M2 | OXYGEN SATURATION: 97 % | SYSTOLIC BLOOD PRESSURE: 95 MMHG | TEMPERATURE: 97.7 F | HEIGHT: 70 IN

## 2023-08-18 DIAGNOSIS — R53.83 FATIGUE, UNSPECIFIED TYPE: ICD-10-CM

## 2023-08-18 DIAGNOSIS — R79.89 ELEVATED FERRITIN: Primary | ICD-10-CM

## 2023-08-18 LAB
ALBUMIN SERPL-MCNC: 4.3 G/DL (ref 3.5–5.2)
ALBUMIN/GLOB SERPL: 1.5 G/DL
ALP SERPL-CCNC: 62 U/L (ref 39–117)
ALT SERPL W P-5'-P-CCNC: 23 U/L (ref 1–41)
ANION GAP SERPL CALCULATED.3IONS-SCNC: 9 MMOL/L (ref 5–15)
AST SERPL-CCNC: 21 U/L (ref 1–40)
BASOPHILS # BLD AUTO: 0.03 10*3/MM3 (ref 0–0.2)
BASOPHILS NFR BLD AUTO: 0.5 % (ref 0–1.5)
BILIRUB SERPL-MCNC: 0.4 MG/DL (ref 0–1.2)
BUN SERPL-MCNC: 12 MG/DL (ref 6–20)
BUN/CREAT SERPL: 13.8 (ref 7–25)
CALCIUM SPEC-SCNC: 9.2 MG/DL (ref 8.6–10.5)
CHLORIDE SERPL-SCNC: 106 MMOL/L (ref 98–107)
CO2 SERPL-SCNC: 27 MMOL/L (ref 22–29)
CREAT SERPL-MCNC: 0.87 MG/DL (ref 0.76–1.27)
CRP SERPL-MCNC: <0.3 MG/DL (ref 0–0.5)
DEPRECATED RDW RBC AUTO: 42.5 FL (ref 37–54)
EGFRCR SERPLBLD CKD-EPI 2021: 101.3 ML/MIN/1.73
EOSINOPHIL # BLD AUTO: 0.13 10*3/MM3 (ref 0–0.4)
EOSINOPHIL NFR BLD AUTO: 2.1 % (ref 0.3–6.2)
ERYTHROCYTE [DISTWIDTH] IN BLOOD BY AUTOMATED COUNT: 12.3 % (ref 12.3–15.4)
ERYTHROCYTE [SEDIMENTATION RATE] IN BLOOD: 14 MM/HR (ref 0–20)
FERRITIN SERPL-MCNC: 655.2 NG/ML (ref 30–400)
GLOBULIN UR ELPH-MCNC: 2.8 GM/DL
GLUCOSE SERPL-MCNC: 106 MG/DL (ref 65–99)
HCT VFR BLD AUTO: 44.2 % (ref 37.5–51)
HGB BLD-MCNC: 14 G/DL (ref 13–17.7)
IMM GRANULOCYTES # BLD AUTO: 0.02 10*3/MM3 (ref 0–0.05)
IMM GRANULOCYTES NFR BLD AUTO: 0.3 % (ref 0–0.5)
IRON 24H UR-MRATE: 86 MCG/DL (ref 59–158)
IRON SATN MFR SERPL: 25 % (ref 20–50)
LYMPHOCYTES # BLD AUTO: 1.8 10*3/MM3 (ref 0.7–3.1)
LYMPHOCYTES NFR BLD AUTO: 29.4 % (ref 19.6–45.3)
MCH RBC QN AUTO: 29.4 PG (ref 26.6–33)
MCHC RBC AUTO-ENTMCNC: 31.7 G/DL (ref 31.5–35.7)
MCV RBC AUTO: 92.9 FL (ref 79–97)
MONOCYTES # BLD AUTO: 0.71 10*3/MM3 (ref 0.1–0.9)
MONOCYTES NFR BLD AUTO: 11.6 % (ref 5–12)
NEUTROPHILS NFR BLD AUTO: 3.44 10*3/MM3 (ref 1.7–7)
NEUTROPHILS NFR BLD AUTO: 56.1 % (ref 42.7–76)
NRBC BLD AUTO-RTO: 0 /100 WBC (ref 0–0.2)
PLATELET # BLD AUTO: 240 10*3/MM3 (ref 140–450)
PMV BLD AUTO: 9.7 FL (ref 6–12)
POTASSIUM SERPL-SCNC: 4.3 MMOL/L (ref 3.5–5.2)
PROT SERPL-MCNC: 7.1 G/DL (ref 6–8.5)
RBC # BLD AUTO: 4.76 10*6/MM3 (ref 4.14–5.8)
SODIUM SERPL-SCNC: 142 MMOL/L (ref 136–145)
TIBC SERPL-MCNC: 344 MCG/DL (ref 298–536)
TRANSFERRIN SERPL-MCNC: 231 MG/DL (ref 200–360)
WBC NRBC COR # BLD: 6.13 10*3/MM3 (ref 3.4–10.8)

## 2023-08-18 PROCEDURE — 80053 COMPREHEN METABOLIC PANEL: CPT

## 2023-08-18 PROCEDURE — 83540 ASSAY OF IRON: CPT

## 2023-08-18 PROCEDURE — 85652 RBC SED RATE AUTOMATED: CPT

## 2023-08-18 PROCEDURE — 82728 ASSAY OF FERRITIN: CPT

## 2023-08-18 PROCEDURE — 86140 C-REACTIVE PROTEIN: CPT

## 2023-08-18 PROCEDURE — 84466 ASSAY OF TRANSFERRIN: CPT

## 2023-08-18 PROCEDURE — 85025 COMPLETE CBC W/AUTO DIFF WBC: CPT

## 2023-08-18 NOTE — PROGRESS NOTES
"DATE OF CONSULTATION:  8/18/2023    REASON FOR REFERRAL: Elevated Ferritin     REFERRING PHYSICIAN:  Brooks Bledsoe PA-C    CHIEF COMPLAINT:  Fatigue    HISTORY OF PRESENT ILLNESS:   Macario Shukla is a very pleasant 58 y.o. male who is being seen today at the request of Brooks Bledsoe PA-C for evaluation and treatment of elevated ferritin.  Mr. Shukla reports following with his PCP every month, with labs ~1 month.  He states that within the last month he has become aware of his elevated ferritin level.  Patient denies any personal or family history of iron overload disorder.  He denies taking any oral iron supplementation, and states that he has not took his multivitamin in over 1 year.  He denies eating iron rich foods in excess.  He denies drinking alcohol.  He is not a current or past smoker.  He does have complaints of chronic aches and pains, he states this pain is \"all over his body\".  He reports a history of osteoarthritis and carpal tunnel syndrome.  He takes Norco 10 mg and ibuprofen as needed for these aches/pains.  He also has complaints of a recent abscessed tooth about 2 weeks ago which he has since completed antibiotics for.  Other than this he reports no recent or hard to treat infections.  He states that he has felt more fatigued lately.  He has chronic issues with headaches/migraines.  Previous available labs were reviewed and the patient has had a known elevated ferritin level since July 5, 2023.  His initial ferritin was 612.7, with H/H of 13.7/46.4; a repeat draw on 7/14/2023 revealed a ferritin of 726.6.  There are no other levels available to review for comparison of trend.  Otherwise no other specific complaints today.    PAST MEDICAL HISTORY:  History reviewed. No pertinent past medical history.    PAST SURGICAL HISTORY:  Past Surgical History:   Procedure Laterality Date    CARPAL TUNNEL RELEASE Right     SPINAL FUSION         FAMILY HISTORY:  Family History   Problem Relation Age of " "Onset    Cancer Mother     Cancer Father        SOCIAL HISTORY:  Social History     Socioeconomic History    Marital status:    Tobacco Use    Smoking status: Never    Smokeless tobacco: Current     Types: Snuff   Vaping Use    Vaping Use: Never used   Substance and Sexual Activity    Alcohol use: Never    Drug use: Never    Sexual activity: Defer     MEDICATIONS:  The current medication list was reviewed in the EMR    Current Outpatient Medications:     gabapentin (NEURONTIN) 300 MG capsule, Take 1 capsule by mouth 3 (Three) Times a Day., Disp: , Rfl:     HYDROcodone-acetaminophen (NORCO) 5-325 MG per tablet, Take 1 tablet by mouth Every 8 (Eight) Hours As Needed for Severe Pain., Disp: 12 tablet, Rfl: 0    ketorolac (TORADOL) 10 MG tablet, Take 1 tablet by mouth Every 6 (Six) Hours As Needed for Moderate Pain. (Patient not taking: Reported on 7/27/2023), Disp: 20 tablet, Rfl: 0    meloxicam (MOBIC) 15 MG tablet, Take 1 tablet by mouth Daily. (Patient not taking: Reported on 8/18/2023), Disp: , Rfl:     methylPREDNISolone (MEDROL) 4 MG dose pack, Use as directed by package instructions (Patient not taking: Reported on 8/18/2023), Disp: 21 tablet, Rfl: 0    ondansetron ODT (ZOFRAN-ODT) 4 MG disintegrating tablet, Place 1 tablet on the tongue Every 8 (Eight) Hours As Needed for Nausea or Vomiting. (Patient not taking: Reported on 8/18/2023), Disp: 20 tablet, Rfl: 0    ALLERGIES:  No Known Allergies    REVIEW OF SYSTEMS:    A comprehensive 14 point review of systems was performed.  Significant findings as mentioned above.  All other systems reviewed and are negative.      ECOG score: 0   Physical Exam  Vital Signs: BP 95/56   Pulse 63   Temp 97.7 øF (36.5 øC) (Temporal)   Resp 18   Ht 177.8 cm (70\")   Wt 100 kg (220 lb 12.8 oz)   SpO2 97%   BMI 31.68 kg/mý     General: Well developed, well nourished, alert and oriented x 3, in no acute distress.   Head: ATNC   Eyes: PERRL, No evidence of conjunctivitis. "   Nose: No nasal discharge.   Mouth: Oral mucosal membranes moist. No oral ulceration or hemorrhages.   Neck: Neck supple. No thyromegaly. No JVD.   Lungs: Clear in all fields to A&P. No wheezing.    Heart: S1, S2. Regular rate and rhythm. No murmurs, rubs, or gallops.   Abdomen: Soft. Bowel sounds are normoactive. Nontender with palpation. No Hepatosplenomegaly can be appreciated.   Extremities: No cyanosis or edema. Peripheral pulses palpable and equal bilaterally.   Integumentary: No rash, sores, erythema or nodules. No blistering, bruising, or dry skin.   Hem/Lymph Nodes: No palpable cervical, submandibular, supraclavicular, axillary  lymphadenopathy noted. No petechiae, purpura or ecchymosis noted.   Neurologic: Grossly non-focal exam    Pain Score:  Pain Score    08/18/23 1341   PainSc: 0-No pain       PHQ-Score Total:  PHQ-9 Total Score: 1    PATHOLOGY:    ENDOSCOPY:    IMAGING:  XR Spine Cervical Complete 4 or 5 View    Result Date: 7/16/2023  Impression:  No acute bony abnormality. .  This report was finalized on 7/16/2023 11:06 AM by Dr. Soy Trimble MD.      XR Knee 3 View Right    Result Date: 7/16/2023    No acute findings in the right knee.  This report was finalized on 7/16/2023 11:45 AM by Dr. Soy Trimble MD.      CT Abdomen Pelvis With Contrast    Result Date: 6/5/2023   DISTENDED GALLBLADDER WITH CALCULI AT THE NECK.  CORRELATION WITH ULTRASOUND IS SUGGESTED IF THERE IS CONCERN FOR ACUTE CHOLECYSTITIS.  This report was finalized on 6/5/2023 4:47 AM by Haritha Albert MD.      US Gallbladder    Result Date: 6/5/2023   1.  Small volume of sludge in the gallbladder lumen. 2.  Possible small shadowing stones at the gallbladder neck. 3.  No features of cholecystitis.  This report was finalized on 6/5/2023 6:46 AM by Marty Bains MD.      XR Hand 3+ View Bilateral    Result Date: 7/16/2023  1.  Joint space narrowing, particularly in the distal interphalangeal joints. 2.  No acute fracture or  dislocation.  This report was finalized on 7/16/2023 12:00 PM by Dr. Soy Trimble MD.      XR Spine Lumbar Complete 4+VW    Result Date: 7/16/2023   1. Postoperative change from fusion. 2. No acute lumbar abnormality.  This report was finalized on 7/16/2023 11:06 AM by Dr. Soy Trimble MD.       Scanned Labs:   7/5/2023 7/14/2023        RECENT LABS:  Lab Results   Component Value Date    WBC 6.13 08/18/2023    HGB 14.0 08/18/2023    HCT 44.2 08/18/2023    MCV 92.9 08/18/2023    RDW 12.3 08/18/2023     08/18/2023    NEUTRORELPCT 56.1 08/18/2023    LYMPHORELPCT 29.4 08/18/2023    MONORELPCT 11.6 08/18/2023    EOSRELPCT 2.1 08/18/2023    BASORELPCT 0.5 08/18/2023    NEUTROABS 3.44 08/18/2023    LYMPHSABS 1.80 08/18/2023       Lab Results   Component Value Date     08/18/2023    K 4.3 08/18/2023    CO2 27.0 08/18/2023     08/18/2023    BUN 12 08/18/2023    CREATININE 0.87 08/18/2023    GLUCOSE 106 (H) 08/18/2023    CALCIUM 9.2 08/18/2023    ALKPHOS 62 08/18/2023    AST 21 08/18/2023    ALT 23 08/18/2023    BILITOT 0.4 08/18/2023    ALBUMIN 4.3 08/18/2023    PROTEINTOT 7.1 08/18/2023    MG 2.2 03/30/2023     Sed Rate  8/18/2023  0 - 20 mm/hr 14     Lab Results   Component Value Date    FERRITIN 655.20 (H) 08/18/2023    IRON 86 08/18/2023    TIBC 344 08/18/2023    LABIRON 25 08/18/2023     C-Reactive Protein  8/18/2023  0.00 - 0.50 mg/dL <0.30     ASSESSMENT & PLAN:  Macario Shukla is a very pleasant 58 y.o. male with    Elevated Ferritin   - Previous available labs were reviewed and the patient has had a known elevated ferritin level since July 5, 2023.  His initial ferritin was 612.7, with H/H of 13.7/46.4; a repeat draw on 7/14/2023 revealed a ferritin of 726.6.  There are no other levels available to review for comparison of trend.    - Patient denies any personal or family history of iron overload disorder.  - He denies taking any oral iron supplementation, and states that he  "has not took his multivitamin in over 1 year.  - He denies eating iron rich foods in excess.  He denies drinking alcohol.  He is not a current or past smoker.    - He does have complaints of chronic aches and pains, he states this pain is \"all over his body\".  He reports a history of osteoarthritis and carpal tunnel syndrome.  He takes Norco 10 mg and ibuprofen as needed for these aches/pains.   - He also has complaints of a recent abscessed tooth about 2 weeks ago which he has since completed antibiotics for. Other than this he reports no recent or hard to treat infections.  - He states that he has felt more fatigued lately.  He has chronic issues with headaches/migraines.  - Obtained additional labs today to further evaluate including CBC, CMP, iron panel, ferritin, CRP, ESR and hemochromatosis mutation panel.  - CBC today shows normal counts.  ESR normal. CRP normal. Iron panel within normal. Ferritin remains elevated at 655.20. CMP unremarkable.   -Discussed with patient that several issues could be contributing to his elevated ferritin level including his recent infection and his underlying inflammation.  -We will plan to follow-up in 1 month with repeat CBC, CMP, iron panel and ferritin level.  We will follow-up with pending labs.  In the meantime, he knows to avoid iron rich foods, iron supplementation, or multivitamins in excess.      ACO / SHELLY/Other  Quality measures  -  Macario Shukla did not receive 2022 flu vaccine.  -  Macario Shukla reports a pain score of 0.    -  Current outpatient and discharge medications have been reconciled for the patient.  Reviewed by: MARISABEL Chavez      The patient was in agreement with the plan and all questions were answered to his satisfaction.     Thank you so much for allowing us to participate in the care of Macario Shukla . Please do not hesitate to contact us with any questions or concerns.     A total of 45 minutes were spent coordinating this patient's " care in clinic today; more than 50% of this time was face-to-face with the patient, reviewing his interim medical history and counseling on the current treatment and followup plan. All questions were answered to his satisfaction.      Electronically Signed by: MARISABEL Chavez , August 18, 2023 15:02 EDT       CC:   ENRICO Infante Jonathan L, PA-C

## 2023-08-18 NOTE — PROGRESS NOTES
Venipuncture Blood Specimen Collection  Venipuncture performed in right arm by Todd Suazo MA with good hemostasis. Patient tolerated the procedure well without complications.   08/18/23   Todd Suazo MA

## 2023-08-24 LAB
HFE GENE MUT ANL BLD/T: NORMAL
IMP & REVIEW OF LAB RESULTS: NORMAL

## 2023-08-28 ENCOUNTER — TRANSCRIBE ORDERS (OUTPATIENT)
Dept: ADMINISTRATIVE | Facility: HOSPITAL | Age: 58
End: 2023-08-28
Payer: MEDICARE

## 2023-08-28 ENCOUNTER — TELEPHONE (OUTPATIENT)
Dept: ONCOLOGY | Facility: CLINIC | Age: 58
End: 2023-08-28

## 2023-08-28 DIAGNOSIS — M54.2 CERVICAL PAIN: Primary | ICD-10-CM

## 2023-08-28 NOTE — TELEPHONE ENCOUNTER
Caller: Macario Shukla    Relationship: Self    Best call back number: [phone]    What was the call regarding: PT CALLED TO GET HIS LAB RESULTS

## 2023-09-08 ENCOUNTER — HOSPITAL ENCOUNTER (OUTPATIENT)
Dept: MRI IMAGING | Facility: HOSPITAL | Age: 58
Discharge: HOME | End: 2023-09-08
Payer: MEDICARE

## 2023-09-08 DIAGNOSIS — M54.2 CERVICAL PAIN: ICD-10-CM

## 2023-10-16 ENCOUNTER — LAB (OUTPATIENT)
Dept: ONCOLOGY | Facility: CLINIC | Age: 58
End: 2023-10-16
Payer: MEDICARE

## 2023-10-16 ENCOUNTER — OFFICE VISIT (OUTPATIENT)
Dept: ONCOLOGY | Facility: CLINIC | Age: 58
End: 2023-10-16
Payer: MEDICARE

## 2023-10-16 VITALS
TEMPERATURE: 97.3 F | HEART RATE: 70 BPM | RESPIRATION RATE: 18 BRPM | OXYGEN SATURATION: 96 % | SYSTOLIC BLOOD PRESSURE: 104 MMHG | BODY MASS INDEX: 33.23 KG/M2 | WEIGHT: 231.6 LBS | DIASTOLIC BLOOD PRESSURE: 61 MMHG

## 2023-10-16 DIAGNOSIS — R79.89 ELEVATED FERRITIN: Primary | ICD-10-CM

## 2023-10-16 DIAGNOSIS — R79.89 ELEVATED FERRITIN: ICD-10-CM

## 2023-10-16 LAB
ALBUMIN SERPL-MCNC: 4.5 G/DL (ref 3.5–5.2)
ALBUMIN/GLOB SERPL: 1.9 G/DL
ALP SERPL-CCNC: 81 U/L (ref 39–117)
ALT SERPL W P-5'-P-CCNC: 30 U/L (ref 1–41)
ANION GAP SERPL CALCULATED.3IONS-SCNC: 9.7 MMOL/L (ref 5–15)
AST SERPL-CCNC: 22 U/L (ref 1–40)
BASOPHILS # BLD AUTO: 0.03 10*3/MM3 (ref 0–0.2)
BASOPHILS NFR BLD AUTO: 0.5 % (ref 0–1.5)
BILIRUB SERPL-MCNC: 0.4 MG/DL (ref 0–1.2)
BUN SERPL-MCNC: 12 MG/DL (ref 6–20)
BUN/CREAT SERPL: 13.3 (ref 7–25)
CALCIUM SPEC-SCNC: 9.4 MG/DL (ref 8.6–10.5)
CHLORIDE SERPL-SCNC: 105 MMOL/L (ref 98–107)
CO2 SERPL-SCNC: 26.3 MMOL/L (ref 22–29)
CREAT SERPL-MCNC: 0.9 MG/DL (ref 0.76–1.27)
DEPRECATED RDW RBC AUTO: 40.7 FL (ref 37–54)
EGFRCR SERPLBLD CKD-EPI 2021: 100.2 ML/MIN/1.73
EOSINOPHIL # BLD AUTO: 0.17 10*3/MM3 (ref 0–0.4)
EOSINOPHIL NFR BLD AUTO: 2.6 % (ref 0.3–6.2)
ERYTHROCYTE [DISTWIDTH] IN BLOOD BY AUTOMATED COUNT: 12.2 % (ref 12.3–15.4)
FERRITIN SERPL-MCNC: 681.7 NG/ML (ref 30–400)
GLOBULIN UR ELPH-MCNC: 2.4 GM/DL
GLUCOSE SERPL-MCNC: 126 MG/DL (ref 65–99)
HCT VFR BLD AUTO: 42.7 % (ref 37.5–51)
HGB BLD-MCNC: 14.3 G/DL (ref 13–17.7)
IMM GRANULOCYTES # BLD AUTO: 0.01 10*3/MM3 (ref 0–0.05)
IMM GRANULOCYTES NFR BLD AUTO: 0.2 % (ref 0–0.5)
IRON 24H UR-MRATE: 84 MCG/DL (ref 59–158)
IRON SATN MFR SERPL: 25 % (ref 20–50)
LYMPHOCYTES # BLD AUTO: 2.16 10*3/MM3 (ref 0.7–3.1)
LYMPHOCYTES NFR BLD AUTO: 33.5 % (ref 19.6–45.3)
MCH RBC QN AUTO: 30.4 PG (ref 26.6–33)
MCHC RBC AUTO-ENTMCNC: 33.5 G/DL (ref 31.5–35.7)
MCV RBC AUTO: 90.9 FL (ref 79–97)
MONOCYTES # BLD AUTO: 0.73 10*3/MM3 (ref 0.1–0.9)
MONOCYTES NFR BLD AUTO: 11.3 % (ref 5–12)
NEUTROPHILS NFR BLD AUTO: 3.34 10*3/MM3 (ref 1.7–7)
NEUTROPHILS NFR BLD AUTO: 51.9 % (ref 42.7–76)
NRBC BLD AUTO-RTO: 0 /100 WBC (ref 0–0.2)
PLATELET # BLD AUTO: 255 10*3/MM3 (ref 140–450)
PMV BLD AUTO: 9.9 FL (ref 6–12)
POTASSIUM SERPL-SCNC: 4.1 MMOL/L (ref 3.5–5.2)
PROT SERPL-MCNC: 6.9 G/DL (ref 6–8.5)
RBC # BLD AUTO: 4.7 10*6/MM3 (ref 4.14–5.8)
SODIUM SERPL-SCNC: 141 MMOL/L (ref 136–145)
TIBC SERPL-MCNC: 343 MCG/DL (ref 298–536)
TRANSFERRIN SERPL-MCNC: 230 MG/DL (ref 200–360)
WBC NRBC COR # BLD: 6.44 10*3/MM3 (ref 3.4–10.8)

## 2023-10-16 PROCEDURE — 80053 COMPREHEN METABOLIC PANEL: CPT

## 2023-10-16 PROCEDURE — 85025 COMPLETE CBC W/AUTO DIFF WBC: CPT

## 2023-10-16 PROCEDURE — 82728 ASSAY OF FERRITIN: CPT

## 2023-10-16 PROCEDURE — 84466 ASSAY OF TRANSFERRIN: CPT

## 2023-10-16 PROCEDURE — 83540 ASSAY OF IRON: CPT

## 2023-10-16 NOTE — PROGRESS NOTES
Venipuncture Blood Specimen Collection  Venipuncture performed in left arm by Marge Cortes MA with good hemostasis. Patient tolerated the procedure well without complications.   10/16/23   Marge Cortes MA

## 2023-10-16 NOTE — PROGRESS NOTES
"DATE:  10/17/2023    DIAGNOSIS: Elevated ferritin    CHIEF COMPLAINT:  Fatigue      HISTORY OF PRESENT ILLNESS:   Macario Shukla is a very pleasant 58 y.o. male who is being seen today at the request of Brooks Bledsoe PA-C for evaluation and treatment of elevated ferritin.  Mr. Shukla reports following with his PCP every month, with labs ~1 month.  He states that within the last month he has become aware of his elevated ferritin level.  Patient denies any personal or family history of iron overload disorder.  He denies taking any oral iron supplementation, and states that he has not took his multivitamin in over 1 year.  He denies eating iron rich foods in excess.  He denies drinking alcohol.  He is not a current or past smoker.  He does have complaints of chronic aches and pains, he states this pain is \"all over his body\".  He reports a history of osteoarthritis and carpal tunnel syndrome.  He takes Norco 10 mg and ibuprofen as needed for these aches/pains.  He also has complaints of a recent abscessed tooth about 2 weeks ago which he has since completed antibiotics for.  Other than this he reports no recent or hard to treat infections.  He states that he has felt more fatigued lately.  He has chronic issues with headaches/migraines.  Previous available labs were reviewed and the patient has had a known elevated ferritin level since July 5, 2023.  His initial ferritin was 612.7, with H/H of 13.7/46.4; a repeat draw on 7/14/2023 revealed a ferritin of 726.6.  There are no other levels available to review for comparison of trend.  Otherwise no other specific complaints today.     Interval History:  Mr. Shukla presents today for follow up of elevated ferritin. Since his last visit, he states that he has been doing well. He continues to report fatigue that is stable. He is otherwise without specific complaints.      The following portions of the patient's history were reviewed and updated as appropriate: " allergies, current medications, past family history, past medical history, past social history, past surgical history and problem list.    PAST MEDICAL HISTORY:  History reviewed. No pertinent past medical history.    PAST SURGICAL HISTORY:  Past Surgical History:   Procedure Laterality Date    CARPAL TUNNEL RELEASE Right     SPINAL FUSION         SOCIAL HISTORY:  Social History     Socioeconomic History    Marital status:    Tobacco Use    Smoking status: Never    Smokeless tobacco: Current     Types: Snuff   Vaping Use    Vaping Use: Never used   Substance and Sexual Activity    Alcohol use: Never    Drug use: Never    Sexual activity: Defer       FAMILY HISTORY:  Family History   Problem Relation Age of Onset    Cancer Mother     Cancer Father        MEDICATIONS:  The current medication list was reviewed in the EMR    Current Outpatient Medications:     gabapentin (NEURONTIN) 300 MG capsule, Take 1 capsule by mouth 3 (Three) Times a Day., Disp: , Rfl:     HYDROcodone-acetaminophen (NORCO) 5-325 MG per tablet, Take 1 tablet by mouth Every 8 (Eight) Hours As Needed for Severe Pain., Disp: 12 tablet, Rfl: 0    ketorolac (TORADOL) 10 MG tablet, Take 1 tablet by mouth Every 6 (Six) Hours As Needed for Moderate Pain., Disp: 20 tablet, Rfl: 0    meloxicam (MOBIC) 15 MG tablet, Take 1 tablet by mouth Daily., Disp: , Rfl:     methylPREDNISolone (MEDROL) 4 MG dose pack, Use as directed by package instructions, Disp: 21 tablet, Rfl: 0    ondansetron ODT (ZOFRAN-ODT) 4 MG disintegrating tablet, Place 1 tablet on the tongue Every 8 (Eight) Hours As Needed for Nausea or Vomiting., Disp: 20 tablet, Rfl: 0    traZODone (DESYREL) 50 MG tablet, Take 1 tablet by mouth every night at bedtime., Disp: , Rfl:     ALLERGIES:  No Known Allergies      REVIEW OF SYSTEMS:    A comprehensive 14 point review of systems was performed.  Significant findings as mentioned above.  All other systems reviewed and are negative.         Physical Exam   Vital Signs:   Vitals:    10/16/23 1544   BP: 104/61   Pulse: 70   Resp: 18   Temp: 97.3 °F (36.3 °C)   SpO2: 96%           ECOG score: 0     General: Well developed, well nourished, alert and oriented x 3, in no acute distress.   Head: ATNC   Eyes: PERRL, No evidence of conjunctivitis.   Neck: Neck supple. No thyromegaly. No JVD.   Lungs: Clear in all fields to A&P without rales, rhonchi or wheezing.   Heart: S1, S2. Regular rate and rhythm. No murmurs, rubs, or gallops.   Abdomen: Soft. Bowel sounds are normoactive. Nontender with palpation. No Hepatosplenomegaly can be appreciated.   Extremities: No cyanosis or edema. Peripheral pulses palpable and +2 bilaterally.   Integumentary: No rash, sores, erythema or nodules. No blistering, bruising, or dry skin.   Lymph Nodes: No palpable cervical, submandibular, supraclavicular, axillary or inguinal lymphadenopathy noted. No petechiae, purpura or ecchymosis noted.   Neurologic: Alert, awake and oriented x 3. Motor strength 5/5 in all four extremities. Cranial nerves 2-12 grossly intact. No sensory deficit.    Pain Score:  Pain Score    10/16/23 1544   PainSc:   5       PHQ-Score Total:  PHQ-9 Total Score:         PATHOLOGY:        ENDOSCOPY:        IMAGING:  XR Hand 3+ View Bilateral    Result Date: 7/16/2023  1.  Joint space narrowing, particularly in the distal interphalangeal joints. 2.  No acute fracture or dislocation.  This report was finalized on 7/16/2023 12:00 PM by Dr. Soy Trimble MD.      XR Knee 3 View Right    Result Date: 7/16/2023    No acute findings in the right knee.  This report was finalized on 7/16/2023 11:45 AM by Dr. Soy Trimble MD.      XR Spine Cervical Complete 4 or 5 View    Result Date: 7/16/2023  Impression:  No acute bony abnormality. .  This report was finalized on 7/16/2023 11:06 AM by Dr. Soy Trimble MD.      XR Spine Lumbar Complete 4+VW    Result Date: 7/16/2023   1. Postoperative change from fusion. 2. No  acute lumbar abnormality.  This report was finalized on 7/16/2023 11:06 AM by Dr. Soy Trimble MD.        Scanned Labs:   7/5/2023 7/14/2023       Initial Workup - 08/18/2023  Component      Latest Ref Rng 8/18/2023   WBC      3.40 - 10.80 10*3/mm3 6.13    RBC      4.14 - 5.80 10*6/mm3 4.76    Hemoglobin      13.0 - 17.7 g/dL 14.0    Hematocrit      37.5 - 51.0 % 44.2    MCV      79.0 - 97.0 fL 92.9    MCH      26.6 - 33.0 pg 29.4    MCHC      31.5 - 35.7 g/dL 31.7    RDW      12.3 - 15.4 % 12.3    RDW-SD      37.0 - 54.0 fl 42.5    MPV      6.0 - 12.0 fL 9.7    Platelets      140 - 450 10*3/mm3 240    Neutrophil Rel %      42.7 - 76.0 % 56.1    Lymphocyte Rel %      19.6 - 45.3 % 29.4    Monocyte Rel %      5.0 - 12.0 % 11.6    Eosinophil Rel %      0.3 - 6.2 % 2.1    Basophil Rel %      0.0 - 1.5 % 0.5    Immature Granulocyte Rel %      0.0 - 0.5 % 0.3    Neutrophils Absolute      1.70 - 7.00 10*3/mm3 3.44    Lymphocytes Absolute      0.70 - 3.10 10*3/mm3 1.80    Monocytes Absolute      0.10 - 0.90 10*3/mm3 0.71    Eosinophils Absolute      0.00 - 0.40 10*3/mm3 0.13    Basophils Absolute      0.00 - 0.20 10*3/mm3 0.03    Immature Grans, Absolute      0.00 - 0.05 10*3/mm3 0.02    nRBC      0.0 - 0.2 /100 WBC 0.0      Component      Latest Ref Rn 8/18/2023   Iron      59 - 158 mcg/dL 86    Iron Saturation (TSAT)      20 - 50 % 25    Transferrin      200 - 360 mg/dL 231    TIBC      298 - 536 mcg/dL 344      Component      Latest Ref Rn 8/18/2023   Ferritin      30.00 - 400.00 ng/mL 655.20 (H)        Lab Results   Component Value Date    SEDRATE 14 08/18/2023    SEDRATE 23 (H) 03/30/2023     Lab Results   Component Value Date    CRP <0.30 08/18/2023     Hemochromatosis Mutation  Order: [id number]  Status: Final result       Visible to patient: No (not released)       Next appt: None       Dx: Elevated ferritin    Specimen Information: Arm, Right; Blood   0 Result Notes      Component 1 mo ago    Hemochromatosis Gene Comment   Comment: Result:  c.845G>A (p.Igs478Xyw) - Not Detected  c.187C>G (p.Jkk53Atw) - Not Detected  c.193A>T (p.Jrg57Pmf) - Not Detected  Not associated with increased risk to develop clinical symptoms  of Hereditary Hemochromatosis.          RECENT LABS:  Lab Results   Component Value Date    WBC 6.44 10/16/2023    HGB 14.3 10/16/2023    HCT 42.7 10/16/2023    MCV 90.9 10/16/2023    RDW 12.2 (L) 10/16/2023     10/16/2023    NEUTRORELPCT 51.9 10/16/2023    LYMPHORELPCT 33.5 10/16/2023    MONORELPCT 11.3 10/16/2023    EOSRELPCT 2.6 10/16/2023    BASORELPCT 0.5 10/16/2023    NEUTROABS 3.34 10/16/2023    LYMPHSABS 2.16 10/16/2023       Lab Results   Component Value Date     10/16/2023    K 4.1 10/16/2023    CO2 26.3 10/16/2023     10/16/2023    BUN 12 10/16/2023    CREATININE 0.90 10/16/2023    GLUCOSE 126 (H) 10/16/2023    CALCIUM 9.4 10/16/2023    ALKPHOS 81 10/16/2023    AST 22 10/16/2023    ALT 30 10/16/2023    BILITOT 0.4 10/16/2023    ALBUMIN 4.5 10/16/2023    PROTEINTOT 6.9 10/16/2023    MG 2.2 03/30/2023       Lab Results   Component Value Date    FERRITIN 681.70 (H) 10/16/2023    IRON 84 10/16/2023    TIBC 343 10/16/2023    LABIRON 25 10/16/2023        .  ASSESSMENT & PLAN:  Macario Shukla is a very pleasant 58 y.o. male with    Elevated Ferritin   - Previous available labs were reviewed and the patient has had a known elevated ferritin level since July 5, 2023.  His initial ferritin was 612.7, with H/H of 13.7/46.4; a repeat draw on 7/14/2023 revealed a ferritin of 726.6.  There are no other levels available to review for comparison of trend.    - Patient denies any personal or family history of iron overload disorder.  - He denies taking any oral iron supplementation, and states that he has not took his multivitamin in over 1 year.  - He denies eating iron rich foods in excess.  He denies drinking alcohol.  He is not a current or past smoker.    - He does have  "complaints of chronic aches and pains, he states this pain is \"all over his body\".  He reports a history of osteoarthritis and carpal tunnel syndrome.  He takes Norco 10 mg and ibuprofen as needed for these aches/pains.   - He also has complaints of a recent abscessed tooth about 2 weeks ago which he has since completed antibiotics for. Other than this he reports no recent or hard to treat infections.  - He states that he has felt more fatigued lately.  He has chronic issues with headaches/migraines.  - Obtained additional labs to further evaluate including CBC, CMP, iron panel, ferritin, CRP, ESR and hemochromatosis mutation panel were without cause for concern.  - CBC today with WBC 6.44, Hg 14.3, Hct 42.7 and platelets 255,000. Iron profile is without cause for concern. Ferritin remains elevated (681.70).  - Will follow up in 3 months with repeat CBCD, iron profile, ferritin. He is aware to avoid multivitamins, iron rich foods or iron supplementation.        ACO / SHELLY/Other  Quality measures  -  Macario Shukla did not receive 2023 flu vaccine.  This was recommended.  -  Macario Shukla reports a pain score of 5.  Given his pain assessment as noted, treatment options were discussed and the following options were decided upon as a follow-up plan to address the patient's pain: continuation of current treatment plan for pain and referral to Primary Care for assistance in pain treatment guidance.  -  Current outpatient and discharge medications have been reconciled for the patient.  Reviewed by: MARISABEL Hill         I spent 30 minutes with Macario Shukla today.  In the office today, more than 50% of this time was spent face-to-face with him  in counseling / coordination of care, reviewing his interim medical history and counseling on the current treatment plan.  All questions were answered to his satisfaction.              Electronically Signed by: MARISABEL Boo APRN October 17, 2023 08:23 EDT "       CC:   No ref. provider found  Brooks Bledsoe PA-C

## 2024-02-06 ENCOUNTER — APPOINTMENT (OUTPATIENT)
Dept: GENERAL RADIOLOGY | Facility: HOSPITAL | Age: 59
End: 2024-02-06
Payer: MEDICARE

## 2024-02-06 ENCOUNTER — HOSPITAL ENCOUNTER (EMERGENCY)
Facility: HOSPITAL | Age: 59
Discharge: HOME | End: 2024-02-06
Attending: STUDENT IN AN ORGANIZED HEALTH CARE EDUCATION/TRAINING PROGRAM | Admitting: STUDENT IN AN ORGANIZED HEALTH CARE EDUCATION/TRAINING PROGRAM
Payer: MEDICARE

## 2024-02-06 ENCOUNTER — APPOINTMENT (OUTPATIENT)
Dept: CT IMAGING | Facility: HOSPITAL | Age: 59
End: 2024-02-06
Payer: MEDICARE

## 2024-02-06 VITALS
TEMPERATURE: 98.6 F | DIASTOLIC BLOOD PRESSURE: 71 MMHG | SYSTOLIC BLOOD PRESSURE: 122 MMHG | HEIGHT: 69 IN | WEIGHT: 225 LBS | HEART RATE: 72 BPM | OXYGEN SATURATION: 98 % | BODY MASS INDEX: 33.33 KG/M2 | RESPIRATION RATE: 18 BRPM

## 2024-02-06 DIAGNOSIS — S42.402A CLOSED FRACTURE OF LEFT ELBOW, INITIAL ENCOUNTER: Primary | ICD-10-CM

## 2024-02-06 PROCEDURE — 73060 X-RAY EXAM OF HUMERUS: CPT

## 2024-02-06 PROCEDURE — 73080 X-RAY EXAM OF ELBOW: CPT | Performed by: RADIOLOGY

## 2024-02-06 PROCEDURE — 73130 X-RAY EXAM OF HAND: CPT | Performed by: RADIOLOGY

## 2024-02-06 PROCEDURE — 99284 EMERGENCY DEPT VISIT MOD MDM: CPT

## 2024-02-06 PROCEDURE — 90715 TDAP VACCINE 7 YRS/> IM: CPT | Performed by: STUDENT IN AN ORGANIZED HEALTH CARE EDUCATION/TRAINING PROGRAM

## 2024-02-06 PROCEDURE — 25010000002 ONDANSETRON PER 1 MG: Performed by: STUDENT IN AN ORGANIZED HEALTH CARE EDUCATION/TRAINING PROGRAM

## 2024-02-06 PROCEDURE — 70486 CT MAXILLOFACIAL W/O DYE: CPT

## 2024-02-06 PROCEDURE — 25010000002 MORPHINE PER 10 MG: Performed by: STUDENT IN AN ORGANIZED HEALTH CARE EDUCATION/TRAINING PROGRAM

## 2024-02-06 PROCEDURE — 73130 X-RAY EXAM OF HAND: CPT

## 2024-02-06 PROCEDURE — 96374 THER/PROPH/DIAG INJ IV PUSH: CPT

## 2024-02-06 PROCEDURE — 70486 CT MAXILLOFACIAL W/O DYE: CPT | Performed by: RADIOLOGY

## 2024-02-06 PROCEDURE — 25010000002 TETANUS-DIPHTH-ACELL PERTUSSIS 5-2.5-18.5 LF-MCG/0.5 SUSPENSION PREFILLED SYRINGE: Performed by: STUDENT IN AN ORGANIZED HEALTH CARE EDUCATION/TRAINING PROGRAM

## 2024-02-06 PROCEDURE — 70450 CT HEAD/BRAIN W/O DYE: CPT

## 2024-02-06 PROCEDURE — 96376 TX/PRO/DX INJ SAME DRUG ADON: CPT

## 2024-02-06 PROCEDURE — 73080 X-RAY EXAM OF ELBOW: CPT

## 2024-02-06 PROCEDURE — 73060 X-RAY EXAM OF HUMERUS: CPT | Performed by: RADIOLOGY

## 2024-02-06 PROCEDURE — 90471 IMMUNIZATION ADMIN: CPT | Performed by: STUDENT IN AN ORGANIZED HEALTH CARE EDUCATION/TRAINING PROGRAM

## 2024-02-06 PROCEDURE — 96375 TX/PRO/DX INJ NEW DRUG ADDON: CPT

## 2024-02-06 PROCEDURE — 73090 X-RAY EXAM OF FOREARM: CPT

## 2024-02-06 PROCEDURE — 25010000002 HYDROMORPHONE 1 MG/ML SOLUTION: Performed by: STUDENT IN AN ORGANIZED HEALTH CARE EDUCATION/TRAINING PROGRAM

## 2024-02-06 PROCEDURE — 73090 X-RAY EXAM OF FOREARM: CPT | Performed by: RADIOLOGY

## 2024-02-06 PROCEDURE — 70450 CT HEAD/BRAIN W/O DYE: CPT | Performed by: RADIOLOGY

## 2024-02-06 RX ORDER — OXYCODONE AND ACETAMINOPHEN 7.5; 325 MG/1; MG/1
1 TABLET ORAL EVERY 6 HOURS PRN
Qty: 12 TABLET | Refills: 0 | Status: SHIPPED | OUTPATIENT
Start: 2024-02-06 | End: 2024-02-06 | Stop reason: SDUPTHER

## 2024-02-06 RX ORDER — ONDANSETRON 2 MG/ML
4 INJECTION INTRAMUSCULAR; INTRAVENOUS ONCE
Status: COMPLETED | OUTPATIENT
Start: 2024-02-06 | End: 2024-02-06

## 2024-02-06 RX ORDER — OXYCODONE AND ACETAMINOPHEN 7.5; 325 MG/1; MG/1
1 TABLET ORAL EVERY 6 HOURS PRN
Qty: 12 TABLET | Refills: 0 | Status: SHIPPED | OUTPATIENT
Start: 2024-02-06

## 2024-02-06 RX ORDER — SODIUM CHLORIDE 0.9 % (FLUSH) 0.9 %
10 SYRINGE (ML) INJECTION AS NEEDED
Status: DISCONTINUED | OUTPATIENT
Start: 2024-02-06 | End: 2024-02-06 | Stop reason: HOSPADM

## 2024-02-06 RX ADMIN — TETANUS TOXOID, REDUCED DIPHTHERIA TOXOID AND ACELLULAR PERTUSSIS VACCINE, ADSORBED 0.5 ML: 5; 2.5; 8; 8; 2.5 SUSPENSION INTRAMUSCULAR at 17:45

## 2024-02-06 RX ADMIN — HYDROMORPHONE HYDROCHLORIDE 1 MG: 1 INJECTION, SOLUTION INTRAMUSCULAR; INTRAVENOUS; SUBCUTANEOUS at 18:16

## 2024-02-06 RX ADMIN — ONDANSETRON 4 MG: 2 INJECTION INTRAMUSCULAR; INTRAVENOUS at 17:44

## 2024-02-06 RX ADMIN — MORPHINE SULFATE 4 MG: 4 INJECTION, SOLUTION INTRAMUSCULAR; INTRAVENOUS at 17:44

## 2024-02-06 RX ADMIN — HYDROMORPHONE HYDROCHLORIDE 1 MG: 1 INJECTION, SOLUTION INTRAMUSCULAR; INTRAVENOUS; SUBCUTANEOUS at 18:47

## 2024-02-06 NOTE — ED NOTES
Patient reports falling and his dog landing on him on the black top. Patient injured his left elbow, and also hit his head but did not report losing consciousness.

## 2024-02-07 NOTE — ED PROVIDER NOTES
Subjective   History of Present Illness  58-year-old male presents secondary to head injury along with left elbow injury.  Patient states that he was walking his dog.  This is a large pitbull.  He states another dog came out and it spooked his dog.  His dog drugged him to the ground.  He landed on his head and on his left elbow.  No loss of consciousness however he is continue to have headache.  He is not on blood thinners.  He states his headache is pretty severe.  He has no past medical problems.  He presents by private vehicle.        Review of Systems   Constitutional: Negative.  Negative for fever.   HENT: Negative.     Respiratory: Negative.     Cardiovascular: Negative.  Negative for chest pain.   Gastrointestinal: Negative.  Negative for abdominal pain.   Endocrine: Negative.    Genitourinary: Negative.  Negative for dysuria.   Skin: Negative.    Neurological: Negative.    Psychiatric/Behavioral: Negative.     All other systems reviewed and are negative.      No past medical history on file.    No Known Allergies    Past Surgical History:   Procedure Laterality Date    CARPAL TUNNEL RELEASE Right     SPINAL FUSION         Family History   Problem Relation Age of Onset    Cancer Mother     Cancer Father        Social History     Socioeconomic History    Marital status:    Tobacco Use    Smoking status: Never    Smokeless tobacco: Current     Types: Snuff   Vaping Use    Vaping Use: Never used   Substance and Sexual Activity    Alcohol use: Never    Drug use: Never    Sexual activity: Defer           Objective   Physical Exam  Vitals and nursing note reviewed.   Constitutional:       General: He is not in acute distress.     Appearance: He is well-developed. He is not diaphoretic.   HENT:      Head: Normocephalic and atraumatic.      Right Ear: External ear normal.      Left Ear: External ear normal.      Nose: Nose normal.   Eyes:      Conjunctiva/sclera: Conjunctivae normal.      Pupils: Pupils are  equal, round, and reactive to light.   Neck:      Vascular: No JVD.      Trachea: No tracheal deviation.   Cardiovascular:      Rate and Rhythm: Normal rate and regular rhythm.      Heart sounds: Normal heart sounds. No murmur heard.  Pulmonary:      Effort: Pulmonary effort is normal. No respiratory distress.      Breath sounds: Normal breath sounds. No wheezing.   Abdominal:      General: Bowel sounds are normal.      Palpations: Abdomen is soft.      Tenderness: There is no abdominal tenderness.   Musculoskeletal:         General: No deformity. Normal range of motion.      Cervical back: Normal range of motion and neck supple.      Comments: Patient with tenderness to left elbow.   Skin:     General: Skin is warm and dry.      Coloration: Skin is not pale.      Findings: No erythema or rash.   Neurological:      Mental Status: He is alert and oriented to person, place, and time.      Cranial Nerves: No cranial nerve deficit.   Psychiatric:         Behavior: Behavior normal.         Thought Content: Thought content normal.         Splint - Cast - Strapping    Date/Time: 2/6/2024 7:58 PM    Performed by: Horacio Alcantar PA  Authorized by: Austin Ny DO    Consent:     Consent obtained:  Verbal    Consent given by:  Patient  Procedure details:     Location:  Elbow    Elbow location:  L elbow    Cast type:  Long arm    Splint type:  Long arm    Supplies:  Sling and fiberglass  Post-procedure details:     Distal neurologic exam:  Normal    Distal perfusion: distal pulses strong      Procedure completion:  Tolerated             ED Course                                   XR Humerus Left    Result Date: 2/6/2024  Displaced proximal olecranon fracture with intra-articular extension.   This report was finalized on 2/6/2024 6:26 PM by Alex Pallas, DO.      XR Elbow 3+ View Left    Result Date: 2/6/2024  Displaced proximal olecranon fracture with intra-articular extension.   This report was finalized on 2/6/2024 6:26  PM by Alex Pallas, DO.      XR Forearm 2 View Left    Result Date: 2/6/2024  Displaced proximal olecranon fracture with intra-articular extension.   This report was finalized on 2/6/2024 6:26 PM by Alex Pallas, DO.      XR Hand 3+ View Left    Result Date: 2/6/2024  Displaced proximal olecranon fracture with intra-articular extension.   This report was finalized on 2/6/2024 6:26 PM by Alex Pallas, DO.      XR Hand 3+ View Right    Result Date: 2/6/2024  Soft tissue swelling without acute osseous abnormality evident.   This report was finalized on 2/6/2024 6:26 PM by Alex Pallas, DO.      CT Head Without Contrast    Result Date: 2/6/2024  No acute intracranial process. Negative for facial bone fracture.   This report was finalized on 2/6/2024 5:17 PM by Alex Pallas, DO.      CT Facial Bones Without Contrast    Result Date: 2/6/2024  No acute intracranial process. Negative for facial bone fracture.   This report was finalized on 2/6/2024 5:17 PM by Alex Pallas, DO.       Emergency Medicine: Utilization of CT for Minor Blunt Head Trauma (Adult)    []  Patient has one or more of the following conditions that are excluded from the measure (select all that apply)        []  Patient has ventricular shunt        []  Patient has brain tumor        []  Patient is pregnant        []  Patient has multi-system trauma        []  Patient taking an antiplatelet medication (excluding aspirin)        []  Head CT not ordered by emergency care clinician        []  Head CT ordered for reasons other than trauma    []  Patient is 18 or older, presenting with minor blunt head trauma.  Head CT was ordered by  an emergency care clinician for trauma because:           []  Patient is 65 or older         []  Patient GCS< 15         []  Patient has focal neurologic deficit         [x]  Patient has severe headache         []  Patient is vomiting         []  Severe/Mechanism of injury was identified (Also, select one or more below)              []  " MVA with: patient ejection, death of another passenger, rollover, speed >40 mph, airbag deployment,  or passenger on ATV or motorcycle              []  pedestrian or bicyclist without helmet: struck my motorized vehicle, in bicycle crash               [x]  fall > 3 feet or 5 stairs              []  head struck by high-impact object (hammer, baseball, baseball bat, heavy object such as falling brick              []  Other _________________________________________________    []  Patient has physical signs of basilar skull fracture present (including hemotympanum, \"raccoon\" eyes, CSF leakage from ear or nose, Currie's sign)    []  Patient suspected of taking anticoagulant medication    []  Patient has thrombocytopenia    []  Patient has coagulopathy    []  Patient has loss of consciousness and (must, select one of the following):                 []  Headache              []  Short term memory deficit              []  Alcohol/drug intoxication              []  Evidence of trauma above the clavicles              []  Age 60 or older              []  Post-traumatic seizure    []  Patient has post-traumatic amnesia and (must, select one of the following):                  []  Headache              []  Short term memory deficit              []  Alcohol/drug intoxication              []  Evidence of trauma above the clavicles              []  Age 60 or older              []  Post-traumatic seizure  []  Patient is 18 or older, presenting with minor blunt head trauma, Head CT (including cosigned orders) was ordered by an emergency care clinician for trauma, no     indication specified.  (Does not satisfy MIPS performance).                    Medical Decision Making  58-year-old male presents secondary to head injury along with left elbow injury.  Patient states that he was walking his dog.  This is a large pitbull.  He states another dog came out and it spooked his dog.  His dog drugged him to the ground.  He landed on " his head and on his left elbow.  No loss of consciousness however he is continue to have headache.  He is not on blood thinners.  He states his headache is pretty severe.  He has no past medical problems.  He presents by private vehicle.    Problems Addressed:  Closed fracture of left elbow, initial encounter: complicated acute illness or injury    Amount and/or Complexity of Data Reviewed  Radiology: ordered. Decision-making details documented in ED Course.  Discussion of management or test interpretation with external provider(s): Dr. Staley    Risk  Prescription drug management.  Risk Details: Patient was counseled at his x-ray results.  Dr. Staley recommended splint and follow-up in the office.  Splint was placed without difficulty.  Patient was neurovascular intact distally.  He was counseled on signs and symptoms worsening appropriate follow-up.  He voiced understanding.        Final diagnoses:   Closed fracture of left elbow, initial encounter       ED Disposition  ED Disposition       ED Disposition   Discharge    Condition   Stable    Comment   --               Grant Staley MD  [address] [place] [address] [place] [address] [phone]    Schedule an appointment as soon as possible for a visit            Medication List        New Prescriptions      oxyCODONE-acetaminophen 7.5-325 MG per tablet  Commonly known as: PERCOCET  Take 1 tablet by mouth Every 6 (Six) Hours As Needed for Moderate Pain or Severe Pain.            Stop      ketorolac 10 MG tablet  Commonly known as: TORADOL     meloxicam 15 MG tablet  Commonly known as: MOBIC     methylPREDNISolone 4 MG dose pack  Commonly known as: MEDROL     ondansetron ODT 4 MG disintegrating tablet  Commonly known as: ZOFRAN-ODT               Where to Get Your Medications        These medications were sent to [id number] - [place] [address] AT [facility] [address] - [phone]  - [phone]   [address] [address]      Phone: [phone]   oxyCODONE-acetaminophen 7.5-325 MG per tablet            Horacio Alcantar PA  02/06/24 1959

## 2024-08-03 ENCOUNTER — HOSPITAL ENCOUNTER (EMERGENCY)
Facility: HOSPITAL | Age: 59
Discharge: HOME | End: 2024-08-03
Attending: EMERGENCY MEDICINE
Payer: MEDICARE

## 2024-08-03 ENCOUNTER — APPOINTMENT (OUTPATIENT)
Dept: GENERAL RADIOLOGY | Facility: HOSPITAL | Age: 59
End: 2024-08-03
Payer: MEDICARE

## 2024-08-03 VITALS
TEMPERATURE: 97.6 F | RESPIRATION RATE: 14 BRPM | OXYGEN SATURATION: 96 % | HEIGHT: 70 IN | SYSTOLIC BLOOD PRESSURE: 130 MMHG | DIASTOLIC BLOOD PRESSURE: 75 MMHG | BODY MASS INDEX: 30.78 KG/M2 | WEIGHT: 215 LBS | HEART RATE: 61 BPM

## 2024-08-03 DIAGNOSIS — R91.1 PULMONARY NODULE: ICD-10-CM

## 2024-08-03 DIAGNOSIS — S20.211A CONTUSION OF RIB ON RIGHT SIDE, INITIAL ENCOUNTER: Primary | ICD-10-CM

## 2024-08-03 DIAGNOSIS — S42.402A CLOSED FRACTURE OF LEFT ELBOW, INITIAL ENCOUNTER: ICD-10-CM

## 2024-08-03 PROCEDURE — 71101 X-RAY EXAM UNILAT RIBS/CHEST: CPT | Performed by: RADIOLOGY

## 2024-08-03 PROCEDURE — 99283 EMERGENCY DEPT VISIT LOW MDM: CPT

## 2024-08-03 PROCEDURE — 71101 X-RAY EXAM UNILAT RIBS/CHEST: CPT

## 2024-08-03 RX ORDER — OXYCODONE AND ACETAMINOPHEN 7.5; 325 MG/1; MG/1
1 TABLET ORAL EVERY 6 HOURS PRN
Qty: 12 TABLET | Refills: 0 | Status: SHIPPED | OUTPATIENT
Start: 2024-08-03

## 2024-08-03 NOTE — ED PROVIDER NOTES
Subjective   History of Present Illness  59-year-old male presents secondary to fall with right rib injury.  Patient states that he stepped in a hole and then landed on a landscape timber.  Patient states that he hurts with movement and breathing.  He states that he feels short of breath because he cannot breathe deep.  He denies any other injury complaint.  He has a past medical history of chronic neck pain.  He is on Norco 10 mg daily.  He states this has not been helping his pain.      Review of Systems   Constitutional: Negative.  Negative for fever.   HENT: Negative.     Respiratory: Negative.     Cardiovascular: Negative.  Negative for chest pain.   Gastrointestinal: Negative.  Negative for abdominal pain.   Endocrine: Negative.    Genitourinary: Negative.  Negative for dysuria.   Skin: Negative.    Neurological: Negative.    Psychiatric/Behavioral: Negative.     All other systems reviewed and are negative.      No past medical history on file.    No Known Allergies    Past Surgical History:   Procedure Laterality Date    CARPAL TUNNEL RELEASE Right     SPINAL FUSION         Family History   Problem Relation Age of Onset    Cancer Mother     Cancer Father        Social History     Socioeconomic History    Marital status:    Tobacco Use    Smoking status: Never    Smokeless tobacco: Current     Types: Snuff   Vaping Use    Vaping status: Never Used   Substance and Sexual Activity    Alcohol use: Never    Drug use: Never    Sexual activity: Defer           Objective   Physical Exam  Vitals and nursing note reviewed.   Constitutional:       General: He is not in acute distress.     Appearance: He is well-developed. He is not diaphoretic.   HENT:      Head: Normocephalic and atraumatic.      Right Ear: External ear normal.      Left Ear: External ear normal.      Nose: Nose normal.   Eyes:      Conjunctiva/sclera: Conjunctivae normal.      Pupils: Pupils are equal, round, and reactive to light.   Neck:       Vascular: No JVD.      Trachea: No tracheal deviation.   Cardiovascular:      Rate and Rhythm: Normal rate and regular rhythm.      Heart sounds: Normal heart sounds. No murmur heard.  Pulmonary:      Effort: Pulmonary effort is normal. No respiratory distress.      Breath sounds: Normal breath sounds. No wheezing.   Abdominal:      General: Bowel sounds are normal.      Palpations: Abdomen is soft.      Tenderness: There is no abdominal tenderness.   Musculoskeletal:         General: No deformity. Normal range of motion.      Cervical back: Normal range of motion and neck supple.      Comments: Patient tender to palpation to his right lateral chest wall.   Skin:     General: Skin is warm and dry.      Coloration: Skin is not pale.      Findings: No erythema or rash.   Neurological:      Mental Status: He is alert and oriented to person, place, and time.      Cranial Nerves: No cranial nerve deficit.   Psychiatric:         Behavior: Behavior normal.         Thought Content: Thought content normal.         Procedures           ED Course  ED Course as of 08/03/24 1740   Sat Aug 03, 2024   0930 Clinically patient has a right rib fracture.  Fortunately no pneumothorax.  No displaced fractures noted.  It was counseled that his pulmonary nodule.  He has an appointment with his primary care on Monday.  They will set him up for an outpatient CT. [JI]      ED Course User Index  [JI] Horacio Alcantar, PA                                      XR Ribs Right With PA Chest    Result Date: 8/3/2024  No rib fracture. Nodule LEFT upper lobe.   [id number] Zip code: [id number]      This report was finalized on 8/3/2024 9:09 AM by Dr. Prasanna White MD.            Medical Decision Making  59-year-old male presents secondary to fall with right rib injury.  Patient states that he stepped in a hole and then landed on a landscape timber.  Patient states that he hurts with movement and breathing.  He states that he feels short of breath because he  cannot breathe deep.  He denies any other injury complaint.  He has a past medical history of chronic neck pain.  He is on Norco 10 mg daily.  He states this has not been helping his pain.    Problems Addressed:  Contusion of rib on right side, initial encounter: complicated acute illness or injury  Pulmonary nodule: complicated acute illness or injury    Amount and/or Complexity of Data Reviewed  Radiology: ordered. Decision-making details documented in ED Course.    Risk  Prescription drug management.  Risk Details: Patient was counseled about signs and symptoms worsening with appropriate follow-up and he voices understanding.        Final diagnoses:   Contusion of rib on right side, initial encounter   Pulmonary nodule       ED Disposition  ED Disposition       ED Disposition   Discharge    Condition   Stable    Comment   --               Brooks Bledsoe PA-C  [facility] [place] [id number] [phone]    On 8/5/2024  As scheduled         Medication List        New Prescriptions      naloxone 4 MG/0.1ML nasal spray  Commonly known as: NARCAN  Call 911. Don't prime. Center in 1 nostril for overdose. Repeat in 2-3 minutes in other nostril if no or minimal breathing/responsiveness.               Where to Get Your Medications        These medications were sent to [place] [address] - [phone] [facility] [phone] [facility] [address] [address]      Phone: [phone]   naloxone 4 MG/0.1ML nasal spray  oxyCODONE-acetaminophen 7.5-325 MG per tablet            Horacio Alcantar PA  08/03/24 4536

## 2024-08-05 ENCOUNTER — TRANSCRIBE ORDERS (OUTPATIENT)
Dept: ADMINISTRATIVE | Facility: HOSPITAL | Age: 59
End: 2024-08-05
Payer: MEDICARE

## 2024-08-05 DIAGNOSIS — R91.1 COIN LESION: Primary | ICD-10-CM

## 2024-08-06 ENCOUNTER — HOSPITAL ENCOUNTER (OUTPATIENT)
Facility: HOSPITAL | Age: 59
Discharge: HOME | End: 2024-08-06
Admitting: PHYSICIAN ASSISTANT
Payer: MEDICARE

## 2024-08-06 DIAGNOSIS — R91.1 COIN LESION: ICD-10-CM

## 2024-08-06 PROCEDURE — 71250 CT THORAX DX C-: CPT | Performed by: RADIOLOGY

## 2024-08-06 PROCEDURE — 71250 CT THORAX DX C-: CPT

## 2024-08-20 ENCOUNTER — TRANSCRIBE ORDERS (OUTPATIENT)
Dept: ADMINISTRATIVE | Facility: HOSPITAL | Age: 59
End: 2024-08-20
Payer: MEDICARE

## 2024-08-20 DIAGNOSIS — E03.9 TRANSIENT HYPOTHYROIDISM: Primary | ICD-10-CM

## 2024-09-03 ENCOUNTER — HOSPITAL ENCOUNTER (OUTPATIENT)
Dept: ULTRASOUND IMAGING | Facility: HOSPITAL | Age: 59
Discharge: HOME | End: 2024-09-03
Admitting: PHYSICIAN ASSISTANT
Payer: MEDICARE

## 2024-09-03 DIAGNOSIS — E03.9 TRANSIENT HYPOTHYROIDISM: ICD-10-CM

## 2024-09-03 PROCEDURE — 76536 US EXAM OF HEAD AND NECK: CPT

## 2024-09-10 ENCOUNTER — OFFICE VISIT (OUTPATIENT)
Dept: PULMONOLOGY | Facility: CLINIC | Age: 59
End: 2024-09-10
Payer: MEDICARE

## 2024-09-10 VITALS
BODY MASS INDEX: 30.95 KG/M2 | DIASTOLIC BLOOD PRESSURE: 74 MMHG | TEMPERATURE: 96.7 F | OXYGEN SATURATION: 98 % | HEART RATE: 71 BPM | SYSTOLIC BLOOD PRESSURE: 128 MMHG | HEIGHT: 70 IN | WEIGHT: 216.2 LBS

## 2024-09-10 DIAGNOSIS — R93.89 ABNORMAL CT OF THE CHEST: ICD-10-CM

## 2024-09-10 DIAGNOSIS — E66.9 OBESITY (BMI 30-39.9): Primary | ICD-10-CM

## 2024-09-10 DIAGNOSIS — S20.313A: ICD-10-CM

## 2024-09-10 PROCEDURE — 99203 OFFICE O/P NEW LOW 30 MIN: CPT | Performed by: INTERNAL MEDICINE

## 2024-09-10 RX ORDER — LOPERAMIDE HCL 2 MG
CAPSULE ORAL
COMMUNITY
Start: 2024-08-05

## 2024-09-10 RX ORDER — HYDROCODONE BITARTRATE AND ACETAMINOPHEN 10; 325 MG/1; MG/1
1 TABLET ORAL 4 TIMES DAILY
COMMUNITY
Start: 2024-03-25

## 2024-09-10 RX ORDER — SUMATRIPTAN 25 MG/1
TABLET, FILM COATED ORAL
COMMUNITY
Start: 2024-08-05

## 2024-09-10 RX ORDER — LEVOTHYROXINE SODIUM 50 UG/1
50 TABLET ORAL DAILY
COMMUNITY
Start: 2024-08-05

## 2024-09-10 RX ORDER — DICLOFENAC POTASSIUM 50 MG/1
TABLET, FILM COATED ORAL
COMMUNITY
Start: 2024-08-05

## 2024-09-10 NOTE — PROGRESS NOTES
Subjective    Macario Shukla presents for the following Abnormal Imaging (Per referral, possible metastatic disease. ) and Shortness of Breath (Worsens on exertion, pain in center of chest. )  .    History of Present Illness   Were you born premature?  no    Any Childhood infections? yes      Breathing problems when you were a child? no    Any childhood allergies?    no             At what age did you begin smoking? no    Smoking marijuana? no    Any IV drugs? no    How many packs per day? 0    Lung Function Test? no  Chest X-Ray? yes    CT Chest? yes Allergy Test? no    Family hx of Lung disease or Lung Cancer?no    If FHx is posivitive for lung cancer, what is the relationship of the family member? NA    Any hospitalization in the last year? no    How far can you walk without getting short of breath? 100 yards    Any coughing? no    Any wheezing? no    Acid Reflux? no    Do you snore? no    Daytime Fatigue? yes    Any pets? yes   Any pet allergies? no    Occupation? [employer]     Have you been exposed to any chemicals at your job? no    What inhalers are you currently using? NA    Above-mentioned questionnaire was reviewed by me in great detail.  Review of Systems  14 point of review of system reviewed and is negative  Active Problems:  Problem List Items Addressed This Visit          Endocrine and Metabolic    Obesity (BMI 30-39.9) - Primary       Symptoms and Signs    Abnormal CT of the chest    Relevant Orders    Case Request (Completed)    CBC & Differential    POC Protime / INR     Other Visit Diagnoses       Abrasion of bilateral front wall of thorax, initial encounter        Relevant Orders    POC Protime / INR            Past Medical History:  Past Medical History:   Diagnosis Date    Lung nodule     Pneumonia     Rheumatoid arthritis        Family History:  Family History   Problem Relation Age of Onset    Cancer Mother     Diabetes Mother     Cancer Father        Social  "History:  Social History     Tobacco Use    Smoking status: Never     Passive exposure: Never    Smokeless tobacco: Current     Types: Snuff    Tobacco comments:     Dip   Substance Use Topics    Alcohol use: Not Currently       Current Medications:  Current Outpatient Medications   Medication Sig Dispense Refill    diclofenac (CATAFLAM) 50 MG tablet TAKE ONE TABLET BY MOUTH TWO TIMES A DAY FOR PAIN OR INFLAMMATION      gabapentin (NEURONTIN) 300 MG capsule Take 1 capsule by mouth 3 (Three) Times a Day.      HYDROcodone-acetaminophen (NORCO)  MG per tablet Take 1 tablet by mouth 4 (Four) Times a Day.      levothyroxine (SYNTHROID, LEVOTHROID) 50 MCG tablet Take 1 tablet by mouth Daily.      loperamide (IMODIUM) 2 MG capsule TAKE ONE CAPSULE BY MOUTH EVERY 6 HOURS      SUMAtriptan (IMITREX) 25 MG tablet TAKE 1 TABLET BY MOUTH AS NEEDED ONSET OF HEADACHE. MAY REPEAT AFTER 2 HOURS       No current facility-administered medications for this visit.       Allergies:  No Known Allergies    Vitals:  /74   Pulse 71   Temp 96.7 °F (35.9 °C)   Ht 177.8 cm (70\")   Wt 98.1 kg (216 lb 3.2 oz)   SpO2 98%   BMI 31.02 kg/m²     Imaging:    Imaging Results (Most Recent)       None            Pulmonary Functions Testing Results:    No results found for: \"FEV1\", \"FVC\", \"ZJB7VCZ\", \"TLC\", \"DLCO\"    Results for orders placed or performed in visit on 10/16/23   Comprehensive Metabolic Panel    Specimen: Arm, Left; Blood   Result Value Ref Range    Glucose 126 (H) 65 - 99 mg/dL    BUN 12 6 - 20 mg/dL    Creatinine 0.90 0.76 - 1.27 mg/dL    Sodium 141 136 - 145 mmol/L    Potassium 4.1 3.5 - 5.2 mmol/L    Chloride 105 98 - 107 mmol/L    CO2 26.3 22.0 - 29.0 mmol/L    Calcium 9.4 8.6 - 10.5 mg/dL    Total Protein 6.9 6.0 - 8.5 g/dL    Albumin 4.5 3.5 - 5.2 g/dL    ALT (SGPT) 30 1 - 41 U/L    AST (SGOT) 22 1 - 40 U/L    Alkaline Phosphatase 81 39 - 117 U/L    Total Bilirubin 0.4 0.0 - 1.2 mg/dL    Globulin 2.4 gm/dL    A/G " Ratio 1.9 g/dL    BUN/Creatinine Ratio 13.3 7.0 - 25.0    Anion Gap 9.7 5.0 - 15.0 mmol/L    eGFR 100.2 >60.0 mL/min/1.73   Iron Profile    Specimen: Arm, Left; Blood   Result Value Ref Range    Iron 84 59 - 158 mcg/dL    Iron Saturation (TSAT) 25 20 - 50 %    Transferrin 230 200 - 360 mg/dL    TIBC 343 298 - 536 mcg/dL   Ferritin    Specimen: Arm, Left; Blood   Result Value Ref Range    Ferritin 681.70 (H) 30.00 - 400.00 ng/mL   CBC Auto Differential    Specimen: Arm, Left; Blood   Result Value Ref Range    WBC 6.44 3.40 - 10.80 10*3/mm3    RBC 4.70 4.14 - 5.80 10*6/mm3    Hemoglobin 14.3 13.0 - 17.7 g/dL    Hematocrit 42.7 37.5 - 51.0 %    MCV 90.9 79.0 - 97.0 fL    MCH 30.4 26.6 - 33.0 pg    MCHC 33.5 31.5 - 35.7 g/dL    RDW 12.2 (L) 12.3 - 15.4 %    RDW-SD 40.7 37.0 - 54.0 fl    MPV 9.9 6.0 - 12.0 fL    Platelets 255 140 - 450 10*3/mm3    Neutrophil % 51.9 42.7 - 76.0 %    Lymphocyte % 33.5 19.6 - 45.3 %    Monocyte % 11.3 5.0 - 12.0 %    Eosinophil % 2.6 0.3 - 6.2 %    Basophil % 0.5 0.0 - 1.5 %    Immature Grans % 0.2 0.0 - 0.5 %    Neutrophils, Absolute 3.34 1.70 - 7.00 10*3/mm3    Lymphocytes, Absolute 2.16 0.70 - 3.10 10*3/mm3    Monocytes, Absolute 0.73 0.10 - 0.90 10*3/mm3    Eosinophils, Absolute 0.17 0.00 - 0.40 10*3/mm3    Basophils, Absolute 0.03 0.00 - 0.20 10*3/mm3    Immature Grans, Absolute 0.01 0.00 - 0.05 10*3/mm3    nRBC 0.0 0.0 - 0.2 /100 WBC       Objective   Physical Exam  General- normal in appearance, not in any acute distress    HEENT- pupils equally reactive to light, normal in size, no scleral icterus    Neck-supple    Respiratory-respirations normal-on auscultation no wheezing no crackles,     Cardiovascular-  Normal S1 and S2. No S3, S4 or murmurs. No JVD, no carotid bruit and no edema, pulses normal bilaterally     GI-nontender nondistended bowel sounds positive    CNS-nonfocal    Musculoskeletal -no edema  Extremities- no obvious deformity noticed     Psychiatric-mood good, good eye  contact, alert awake oriented  Skin- no visible rash      Assessment & Plan   Abnormal CT chest-showing pulmonary nodule.  Patient was given an option to repeat CT chest in 6 months and out to bronchoscopy.  Patient chose for bronchoscopy and repeat CT chest in 6 months.    Procedure was explained and answered his questions to his satisfaction.    Will get a CBC and PT/INR.  Case request in.    CT chest images were reviewed and discussed with the patient.  Answered his questions to his satisfaction.    Obese- Advised to stay active.  Body mass index is 31.02 kg/m².     Recent trauma to the chest-continue symptomatic management.  Continue pain medications.  Medication list reviewed.      ICD-10-CM ICD-9-CM   1. Obesity (BMI 30-39.9)  E66.9 278.00   2. Abnormal CT of the chest  R93.89 793.2   3. Abrasion of bilateral front wall of thorax, initial encounter  S20.313A 911.0       Return in about 3 months (around 12/10/2024).

## 2024-09-23 ENCOUNTER — PRE-ADMISSION TESTING (OUTPATIENT)
Dept: PREADMISSION TESTING | Facility: HOSPITAL | Age: 59
End: 2024-09-23
Payer: MEDICARE

## 2024-09-23 DIAGNOSIS — R93.89 ABNORMAL CT OF THE CHEST: ICD-10-CM

## 2024-09-23 LAB
ANION GAP SERPL CALCULATED.3IONS-SCNC: 12 MMOL/L (ref 5–15)
BASOPHILS # BLD AUTO: 0.03 10*3/MM3 (ref 0–0.2)
BASOPHILS NFR BLD AUTO: 0.5 % (ref 0–1.5)
BUN SERPL-MCNC: 18 MG/DL (ref 6–20)
BUN/CREAT SERPL: 21.4 (ref 7–25)
CALCIUM SPEC-SCNC: 9 MG/DL (ref 8.6–10.5)
CHLORIDE SERPL-SCNC: 104 MMOL/L (ref 98–107)
CO2 SERPL-SCNC: 25 MMOL/L (ref 22–29)
CREAT SERPL-MCNC: 0.84 MG/DL (ref 0.76–1.27)
DEPRECATED RDW RBC AUTO: 41.7 FL (ref 37–54)
EGFRCR SERPLBLD CKD-EPI 2021: 101.7 ML/MIN/1.73
EOSINOPHIL # BLD AUTO: 0.12 10*3/MM3 (ref 0–0.4)
EOSINOPHIL NFR BLD AUTO: 2.2 % (ref 0.3–6.2)
ERYTHROCYTE [DISTWIDTH] IN BLOOD BY AUTOMATED COUNT: 12.3 % (ref 12.3–15.4)
GLUCOSE SERPL-MCNC: 136 MG/DL (ref 65–99)
HCT VFR BLD AUTO: 43.1 % (ref 37.5–51)
HGB BLD-MCNC: 14.1 G/DL (ref 13–17.7)
IMM GRANULOCYTES # BLD AUTO: 0.01 10*3/MM3 (ref 0–0.05)
IMM GRANULOCYTES NFR BLD AUTO: 0.2 % (ref 0–0.5)
INR PPP: 0.94 (ref 0.9–1.1)
LYMPHOCYTES # BLD AUTO: 1.85 10*3/MM3 (ref 0.7–3.1)
LYMPHOCYTES NFR BLD AUTO: 33.9 % (ref 19.6–45.3)
MCH RBC QN AUTO: 30.2 PG (ref 26.6–33)
MCHC RBC AUTO-ENTMCNC: 32.7 G/DL (ref 31.5–35.7)
MCV RBC AUTO: 92.3 FL (ref 79–97)
MONOCYTES # BLD AUTO: 0.65 10*3/MM3 (ref 0.1–0.9)
MONOCYTES NFR BLD AUTO: 11.9 % (ref 5–12)
NEUTROPHILS NFR BLD AUTO: 2.8 10*3/MM3 (ref 1.7–7)
NEUTROPHILS NFR BLD AUTO: 51.3 % (ref 42.7–76)
NRBC BLD AUTO-RTO: 0 /100 WBC (ref 0–0.2)
PLATELET # BLD AUTO: 237 10*3/MM3 (ref 140–450)
PMV BLD AUTO: 11.1 FL (ref 6–12)
POTASSIUM SERPL-SCNC: 3.6 MMOL/L (ref 3.5–5.2)
PROTHROMBIN TIME: 12.6 SECONDS (ref 12.1–14.7)
RBC # BLD AUTO: 4.67 10*6/MM3 (ref 4.14–5.8)
SODIUM SERPL-SCNC: 141 MMOL/L (ref 136–145)
WBC NRBC COR # BLD AUTO: 5.46 10*3/MM3 (ref 3.4–10.8)

## 2024-09-23 PROCEDURE — 80048 BASIC METABOLIC PNL TOTAL CA: CPT

## 2024-09-23 PROCEDURE — 85025 COMPLETE CBC W/AUTO DIFF WBC: CPT

## 2024-09-23 PROCEDURE — 85610 PROTHROMBIN TIME: CPT

## 2024-09-23 PROCEDURE — 36415 COLL VENOUS BLD VENIPUNCTURE: CPT

## 2024-09-25 ENCOUNTER — ANESTHESIA (OUTPATIENT)
Dept: PERIOP | Facility: HOSPITAL | Age: 59
End: 2024-09-25
Payer: MEDICARE

## 2024-09-25 ENCOUNTER — ANESTHESIA EVENT (OUTPATIENT)
Dept: PERIOP | Facility: HOSPITAL | Age: 59
End: 2024-09-25
Payer: MEDICARE

## 2024-09-25 ENCOUNTER — HOSPITAL ENCOUNTER (OUTPATIENT)
Facility: HOSPITAL | Age: 59
Setting detail: HOSPITAL OUTPATIENT SURGERY
Discharge: HOME | End: 2024-09-25
Attending: INTERNAL MEDICINE | Admitting: INTERNAL MEDICINE
Payer: MEDICARE

## 2024-09-25 VITALS
RESPIRATION RATE: 18 BRPM | BODY MASS INDEX: 30.35 KG/M2 | DIASTOLIC BLOOD PRESSURE: 64 MMHG | HEART RATE: 71 BPM | OXYGEN SATURATION: 93 % | WEIGHT: 212 LBS | HEIGHT: 70 IN | TEMPERATURE: 98.2 F | SYSTOLIC BLOOD PRESSURE: 100 MMHG

## 2024-09-25 DIAGNOSIS — R93.89 ABNORMAL CT OF THE CHEST: ICD-10-CM

## 2024-09-25 PROCEDURE — 25010000002 MIDAZOLAM PER 1 MG: Performed by: NURSE ANESTHETIST, CERTIFIED REGISTERED

## 2024-09-25 PROCEDURE — 94799 UNLISTED PULMONARY SVC/PX: CPT

## 2024-09-25 PROCEDURE — 25010000002 PROPOFOL 200 MG/20ML EMULSION: Performed by: NURSE ANESTHETIST, CERTIFIED REGISTERED

## 2024-09-25 PROCEDURE — 25810000003 LACTATED RINGERS PER 1000 ML: Performed by: NURSE ANESTHETIST, CERTIFIED REGISTERED

## 2024-09-25 PROCEDURE — 25010000002 FENTANYL CITRATE (PF) 50 MCG/ML SOLUTION: Performed by: NURSE ANESTHETIST, CERTIFIED REGISTERED

## 2024-09-25 PROCEDURE — 87206 SMEAR FLUORESCENT/ACID STAI: CPT | Performed by: INTERNAL MEDICINE

## 2024-09-25 PROCEDURE — 87102 FUNGUS ISOLATION CULTURE: CPT | Performed by: INTERNAL MEDICINE

## 2024-09-25 PROCEDURE — 87116 MYCOBACTERIA CULTURE: CPT | Performed by: INTERNAL MEDICINE

## 2024-09-25 PROCEDURE — 87205 SMEAR GRAM STAIN: CPT | Performed by: INTERNAL MEDICINE

## 2024-09-25 PROCEDURE — 31624 DX BRONCHOSCOPE/LAVAGE: CPT | Performed by: INTERNAL MEDICINE

## 2024-09-25 PROCEDURE — 25010000002 ONDANSETRON PER 1 MG: Performed by: NURSE ANESTHETIST, CERTIFIED REGISTERED

## 2024-09-25 PROCEDURE — 94640 AIRWAY INHALATION TREATMENT: CPT

## 2024-09-25 PROCEDURE — 87071 CULTURE AEROBIC QUANT OTHER: CPT | Performed by: INTERNAL MEDICINE

## 2024-09-25 RX ORDER — FENTANYL CITRATE 50 UG/ML
50 INJECTION, SOLUTION INTRAMUSCULAR; INTRAVENOUS
Status: DISCONTINUED | OUTPATIENT
Start: 2024-09-25 | End: 2024-09-25 | Stop reason: HOSPADM

## 2024-09-25 RX ORDER — ALBUTEROL SULFATE 0.83 MG/ML
2.5 SOLUTION RESPIRATORY (INHALATION) ONCE
Status: COMPLETED | OUTPATIENT
Start: 2024-09-25 | End: 2024-09-25

## 2024-09-25 RX ORDER — ONDANSETRON 2 MG/ML
4 INJECTION INTRAMUSCULAR; INTRAVENOUS AS NEEDED
Status: DISCONTINUED | OUTPATIENT
Start: 2024-09-25 | End: 2024-09-25 | Stop reason: HOSPADM

## 2024-09-25 RX ORDER — IPRATROPIUM BROMIDE AND ALBUTEROL SULFATE 2.5; .5 MG/3ML; MG/3ML
3 SOLUTION RESPIRATORY (INHALATION) ONCE AS NEEDED
Status: DISCONTINUED | OUTPATIENT
Start: 2024-09-25 | End: 2024-09-25 | Stop reason: HOSPADM

## 2024-09-25 RX ORDER — MIDAZOLAM HYDROCHLORIDE 1 MG/ML
INJECTION INTRAMUSCULAR; INTRAVENOUS AS NEEDED
Status: DISCONTINUED | OUTPATIENT
Start: 2024-09-25 | End: 2024-09-25 | Stop reason: SURG

## 2024-09-25 RX ORDER — ONDANSETRON 2 MG/ML
INJECTION INTRAMUSCULAR; INTRAVENOUS AS NEEDED
Status: DISCONTINUED | OUTPATIENT
Start: 2024-09-25 | End: 2024-09-25 | Stop reason: SURG

## 2024-09-25 RX ORDER — SODIUM CHLORIDE 9 MG/ML
INJECTION, SOLUTION INTRAVENOUS AS NEEDED
Status: DISCONTINUED | OUTPATIENT
Start: 2024-09-25 | End: 2024-09-25 | Stop reason: HOSPADM

## 2024-09-25 RX ORDER — SODIUM CHLORIDE, SODIUM LACTATE, POTASSIUM CHLORIDE, CALCIUM CHLORIDE 600; 310; 30; 20 MG/100ML; MG/100ML; MG/100ML; MG/100ML
INJECTION, SOLUTION INTRAVENOUS CONTINUOUS PRN
Status: DISCONTINUED | OUTPATIENT
Start: 2024-09-25 | End: 2024-09-25 | Stop reason: SURG

## 2024-09-25 RX ORDER — LIDOCAINE HYDROCHLORIDE 10 MG/ML
INJECTION, SOLUTION EPIDURAL; INFILTRATION; INTRACAUDAL; PERINEURAL AS NEEDED
Status: DISCONTINUED | OUTPATIENT
Start: 2024-09-25 | End: 2024-09-25 | Stop reason: HOSPADM

## 2024-09-25 RX ORDER — FENTANYL CITRATE 50 UG/ML
INJECTION, SOLUTION INTRAMUSCULAR; INTRAVENOUS AS NEEDED
Status: DISCONTINUED | OUTPATIENT
Start: 2024-09-25 | End: 2024-09-25 | Stop reason: SURG

## 2024-09-25 RX ORDER — KETOROLAC TROMETHAMINE 30 MG/ML
30 INJECTION, SOLUTION INTRAMUSCULAR; INTRAVENOUS EVERY 6 HOURS PRN
Status: DISCONTINUED | OUTPATIENT
Start: 2024-09-25 | End: 2024-09-25 | Stop reason: HOSPADM

## 2024-09-25 RX ORDER — MEPERIDINE HYDROCHLORIDE 25 MG/ML
12.5 INJECTION INTRAMUSCULAR; INTRAVENOUS; SUBCUTANEOUS
Status: DISCONTINUED | OUTPATIENT
Start: 2024-09-25 | End: 2024-09-25 | Stop reason: HOSPADM

## 2024-09-25 RX ORDER — LIDOCAINE HYDROCHLORIDE 40 MG/ML
3 INJECTION, SOLUTION RETROBULBAR ONCE
Status: COMPLETED | OUTPATIENT
Start: 2024-09-25 | End: 2024-09-25

## 2024-09-25 RX ORDER — PROPOFOL 10 MG/ML
INJECTION, EMULSION INTRAVENOUS AS NEEDED
Status: DISCONTINUED | OUTPATIENT
Start: 2024-09-25 | End: 2024-09-25 | Stop reason: SURG

## 2024-09-25 RX ORDER — FAMOTIDINE 10 MG/ML
INJECTION, SOLUTION INTRAVENOUS AS NEEDED
Status: DISCONTINUED | OUTPATIENT
Start: 2024-09-25 | End: 2024-09-25 | Stop reason: SURG

## 2024-09-25 RX ORDER — OXYCODONE AND ACETAMINOPHEN 5; 325 MG/1; MG/1
1 TABLET ORAL ONCE AS NEEDED
Status: DISCONTINUED | OUTPATIENT
Start: 2024-09-25 | End: 2024-09-25 | Stop reason: HOSPADM

## 2024-09-25 RX ORDER — SODIUM CHLORIDE, SODIUM LACTATE, POTASSIUM CHLORIDE, CALCIUM CHLORIDE 600; 310; 30; 20 MG/100ML; MG/100ML; MG/100ML; MG/100ML
100 INJECTION, SOLUTION INTRAVENOUS ONCE AS NEEDED
Status: DISCONTINUED | OUTPATIENT
Start: 2024-09-25 | End: 2024-09-25 | Stop reason: HOSPADM

## 2024-09-25 RX ADMIN — PROPOFOL 100 MG: 10 INJECTION, EMULSION INTRAVENOUS at 11:39

## 2024-09-25 RX ADMIN — LIDOCAINE HYDROCHLORIDE 3 ML: 40 INJECTION, SOLUTION RETROBULBAR at 10:10

## 2024-09-25 RX ADMIN — SODIUM CHLORIDE, POTASSIUM CHLORIDE, SODIUM LACTATE AND CALCIUM CHLORIDE: 600; 310; 30; 20 INJECTION, SOLUTION INTRAVENOUS at 11:38

## 2024-09-25 RX ADMIN — MIDAZOLAM HYDROCHLORIDE 2 MG: 1 INJECTION, SOLUTION INTRAMUSCULAR; INTRAVENOUS at 11:38

## 2024-09-25 RX ADMIN — FENTANYL CITRATE 100 MCG: 50 INJECTION INTRAMUSCULAR; INTRAVENOUS at 11:38

## 2024-09-25 RX ADMIN — FAMOTIDINE 20 MG: 10 INJECTION, SOLUTION INTRAVENOUS at 11:38

## 2024-09-25 RX ADMIN — ALBUTEROL SULFATE 2.5 MG: 2.5 SOLUTION RESPIRATORY (INHALATION) at 10:04

## 2024-09-25 RX ADMIN — ONDANSETRON 4 MG: 2 INJECTION INTRAMUSCULAR; INTRAVENOUS at 11:38

## 2024-09-25 NOTE — OP NOTE
Bronchoscopy Procedure Note    Date of Operation: 9/25/2024    Pre-op Diagnosis: Abnormal CT chest    Post-op Diagnosis: Abnormal CT chest with left upper lobe pulmonary nodule    Surgeon: Daniel Bennett MD    Assistants: None    Anesthesia: Please see anesthesia report for details    Operation: Flexible fiberoptic bronchoscopy, diagnostic, EBUS    Findings: No endobronchial masses or lesions were seen.  On endobronchial ultrasound no mediastinal lymphadenopathy was identified.    Specimen: Bronchoalveolar lavage of left upper lobe  Bronchoalveolar lavage of lingula    Bronchial washings    Estimated Blood Loss: Minimal    Complications: None    Indications and History:  The patient is a 59 y.o. male with abnormal CT chest with left upper lobe pulmonary nodule.  The risks, benefits, complications, treatment options and expected outcomes were discussed with the patient.  The possibilities of reaction to medication, pulmonary aspiration, perforation of a viscus, bleeding, failure to diagnose a condition and creating a complication requiring transfusion or operation were discussed with the patient who freely signed the consent.      Description of Procedure:  The patient was seen in the Holding Room and the site of surgery properly noted/marked.  The patient was taken to endoscopy suite, identified as Macario Vazquez and the procedure verified as Flexible Fiberoptic Bronchoscopy.  A Time Out was held and the above information confirmed.     the patient was positioned  and the bronchoscope was passed through the LMA . The vocal cords were visualized and  2 ml 1 % lidocaine was topically placed onto the cords. The cords were normal. The scope was then passed into the trachea.      2 ml 1 % lidocaine was used topically on the rukhsana.  Careful inspection of the tracheal lumen was accomplished. The scope was sequentially passed into the left main and then left upper and lower bronchi and segmental bronchi.       The scope  was then withdrawn and advanced into the right main bronchus and then into the RUL, RML, and RLL bronchi and segmental bronchi.     No endobronchial masses or lesions were seen.    On endobronchial ultrasound no mediastinal lymphadenopathy was identified.  Samples-  Bronchial washings    Then the bronchoscope was wedged into the left upper lobe for a bronchioloalveolar lavage and close to 40 cc of saline was given once and close to 18 cc was aspirated back.    Then the bronchoscope was wedged into the lingula for a bronchoalveolar lavage and close to 40 cc of saline was given once and close to 16 cc was aspirated back.    Then the endobronchial ultrasound was introduced mediastinal survey was done and no mediastinal lymphadenopathy was identified.      Samples were sent for cytology and microbiology.  Please follow up the results  The Patient was taken to the Endoscopy Recovery area in satisfactory condition.        Daniel Bennett MD

## 2024-09-25 NOTE — H&P
"      Chief Complaint:  Here for a bronchoscopy for abnormal imaging - ct chest    Ct chest reviewed     Subjective     Interval History: no changes since the last time since was seen in office        Review of Systems:    Review of system system is negative for shortness of breath chest pain lightheadedness dizziness any numbness in any area of the body belly pain nausea vomiting diarrhea shortness of breath cough          Vital Signs  Temp:  [98.3 °F (36.8 °C)] 98.3 °F (36.8 °C)  Heart Rate:  [51-63] 63  Resp:  [18] 18  BP: (105)/(71) 105/71  Body mass index is 30.42 kg/m².  No intake or output data in the 24 hours ending 09/25/24 1130  No intake/output data recorded.    Physical Exam:   General- normal in appearance, not in any acute distress    HEENT- pupils equally reactive to light, normal in size, no scleral icterus    Neck-supple    Chest-respirations normal-on auscultation no wheezing no crackles    S1 and S2 normal    Abdomen-nontender nondistended bowel sounds positive    CNS-nonfocal    Extremities-no edema    Psychiatric-mood good, good eye contact, alert awake oriented     Results Review:     I reviewed the patient's new clinical results.  Results from last 7 days   Lab Units 09/23/24  1305   WBC 10*3/mm3 5.46   HEMOGLOBIN g/dL 14.1   PLATELETS 10*3/mm3 237     Results from last 7 days   Lab Units 09/23/24  1305   SODIUM mmol/L 141   POTASSIUM mmol/L 3.6   CHLORIDE mmol/L 104   CO2 mmol/L 25.0   BUN mg/dL 18   CREATININE mg/dL 0.84   CALCIUM mg/dL 9.0   GLUCOSE mg/dL 136*     Lab Results   Component Value Date    INR 0.94 09/23/2024    PROTIME 12.6 09/23/2024           Invalid input(s): \"PROT\", \"LABALBU\"      Imaging Results (Last 24 Hours)       ** No results found for the last 24 hours. **                   No current facility-administered medications for this encounter.      Medication Review:     Assessment & Plan     Abnormal ct chest- all the medications history physical labs are reviewed.  We " will do a bronchoscopy with bronchoalveolar lavage his bronchial brushings.  We'll send it for cytology and microbiology.    Informed consent taken.      Abnormal CT of the chest               Daniel Bennett MD  09/25/24  11:30 EDT

## 2024-09-26 LAB — REF LAB TEST METHOD: NORMAL

## 2024-09-27 LAB
ACID FAST STN SPEC: NEGATIVE
BACTERIA SPEC AEROBE CULT: NORMAL
GRAM STN SPEC: NORMAL
SPECIMEN PREPARATION: NORMAL

## 2024-09-30 DIAGNOSIS — M25.522 ELBOW PAIN, LEFT: Primary | ICD-10-CM

## 2024-09-30 LAB
FUNGUS SPEC CULT: NORMAL
FUNGUS SPEC FUNGUS STN: NORMAL

## 2024-10-17 ENCOUNTER — TELEPHONE (OUTPATIENT)
Dept: PULMONOLOGY | Facility: CLINIC | Age: 59
End: 2024-10-17
Payer: MEDICARE

## 2024-10-17 DIAGNOSIS — R93.89 ABNORMAL CT OF THE CHEST: Primary | ICD-10-CM

## 2024-10-17 DIAGNOSIS — R91.1 PULMONARY NODULE: ICD-10-CM

## 2024-10-17 NOTE — TELEPHONE ENCOUNTER
----- Message from Daniel Bennett sent at 10/17/2024  2:31 PM EDT -----  Came back good- no infection or cancer.    As nodule is more then 1 cm I am ordering PET CT CHEST  Ty  ss  ----- Message -----  From: Margaret Bledsoe MA  Sent: 10/17/2024   2:21 PM EDT  To: Daniel Bennett MD    Pt called to get his bronch results form 9/25.

## 2024-10-25 LAB
FUNGUS SPEC CULT: NORMAL
FUNGUS SPEC FUNGUS STN: NORMAL

## 2024-10-31 ENCOUNTER — HOSPITAL ENCOUNTER (OUTPATIENT)
Dept: PET IMAGING | Facility: HOSPITAL | Age: 59
Discharge: HOME | End: 2024-10-31
Payer: MEDICARE

## 2024-10-31 DIAGNOSIS — R93.89 ABNORMAL CT OF THE CHEST: ICD-10-CM

## 2024-10-31 DIAGNOSIS — R91.1 PULMONARY NODULE: ICD-10-CM

## 2024-10-31 PROCEDURE — A9552 F18 FDG: HCPCS | Performed by: INTERNAL MEDICINE

## 2024-10-31 PROCEDURE — 0 FLUDEOXYGLUCOSE F18 SOLUTION: Performed by: INTERNAL MEDICINE

## 2024-10-31 PROCEDURE — 78815 PET IMAGE W/CT SKULL-THIGH: CPT

## 2024-10-31 PROCEDURE — 78815 PET IMAGE W/CT SKULL-THIGH: CPT | Performed by: RADIOLOGY

## 2024-10-31 RX ADMIN — FLUDEOXYGLUCOSE F 18 1 DOSE: 200 INJECTION, SOLUTION INTRAVENOUS at 08:22

## 2024-11-08 LAB
ACID FAST STN SPEC: NEGATIVE
MYCOBACTERIUM SPEC QL CULT: NEGATIVE
SPECIMEN PREPARATION: NORMAL

## 2024-12-19 ENCOUNTER — OFFICE VISIT (OUTPATIENT)
Dept: PULMONOLOGY | Facility: CLINIC | Age: 59
End: 2024-12-19
Payer: MEDICARE

## 2024-12-19 VITALS
SYSTOLIC BLOOD PRESSURE: 144 MMHG | OXYGEN SATURATION: 95 % | WEIGHT: 223 LBS | DIASTOLIC BLOOD PRESSURE: 76 MMHG | HEIGHT: 70 IN | TEMPERATURE: 96.4 F | BODY MASS INDEX: 31.92 KG/M2 | HEART RATE: 69 BPM

## 2024-12-19 DIAGNOSIS — R91.1 PULMONARY NODULE: Primary | ICD-10-CM

## 2024-12-19 PROCEDURE — 1160F RVW MEDS BY RX/DR IN RCRD: CPT | Performed by: NURSE PRACTITIONER

## 2024-12-19 PROCEDURE — 1159F MED LIST DOCD IN RCRD: CPT | Performed by: NURSE PRACTITIONER

## 2024-12-19 PROCEDURE — 99214 OFFICE O/P EST MOD 30 MIN: CPT | Performed by: NURSE PRACTITIONER

## 2024-12-19 RX ORDER — CLONAZEPAM 1 MG/1
TABLET ORAL
COMMUNITY
Start: 2024-12-12

## 2024-12-19 NOTE — PROGRESS NOTES
"Chief Complaint  Abnormal CT of the chest    Subjective          Macario Shukla presents to [facility] for   History of Present Illness      Mr. Shukla is a 59 year old male with a medical history significant for migraines, and RA.    He presents today for follow up on an abnormal CT chest.  He states that he has been doing well since he was last seen.  He underwent a bronchoscopy in September 2024, which showed negative cytology and cultures.  He underwent PET/CT was done in October 2024, which showed no hypermetabolic activity and no growth of mass.    Objective   Vital Signs:   /76   Pulse 69   Temp 96.4 °F (35.8 °C)   Ht 177.8 cm (70\")   Wt 101 kg (223 lb)   SpO2 95%   BMI 32.00 kg/m²         Physical Exam    GENERAL APPEARANCE: Well developed, well nourished, alert and cooperative, and appears to be in no acute distress.    HEAD: normocephalic. Atraumatic.    EYES: PERRL, EOMI. Vision is grossly intact.    THROAT: Oral cavity and pharynx normal. No inflammation, swelling, exudate, or lesions.     NECK: Neck supple.  No thyromegaly.    CARDIAC: Normal S1 and S2. No S3, S4 or murmurs. Rhythm is regular.     RESPIRATORY:Bilateral air entry positive. No wheezing, crackles or rhonchi noted.    GI: Positive bowel sounds. Soft, nondistended, nontender.     MUSCULOSKELETAL: No significant deformity or joint abnormality. No edema. Peripheral pulses intact. No varicosities.    NEUROLOGICAL: Strength and sensation symmetric and intact throughout.     PSYCHIATRIC: The mental examination revealed the patient was oriented to person, place, and time.       Estimated body mass index is 32 kg/m² as calculated from the following:    Height as of this encounter: 177.8 cm (70\").    Weight as of this encounter: 101 kg (223 lb).        Result Review :   The following data was reviewed by: MARISABEL Peraza on 12/19/2024:  Common labs          9/23/2024    " 13:05   Common Labs   Glucose 136    BUN 18    Creatinine 0.84    Sodium 141    Potassium 3.6    Chloride 104    Calcium 9.0    WBC 5.46    Hemoglobin 14.1    Hematocrit 43.1    Platelets 237           PFT:NA    Low dose lung cancer screening:NA    Previous chest imaging:    CT Chest 8/6/24    FINDINGS:    LUNGS AND PLEURAL SPACES:  Stable partially calcified pulmonary nodule  left upper lobe measuring 1.3 cm.  No consolidation.  No pneumothorax.   No significant effusion.    HEART:  Unremarkable as visualized.  No cardiomegaly.  No significant  pericardial effusion.  No significant coronary artery calcifications.    BONES/JOINTS:  Unremarkable as visualized.  No acute fracture.  No  dislocation.    SOFT TISSUES:  Unremarkable as visualized.    VASCULATURE:  Unremarkable as visualized.  No thoracic aortic  aneurysm.    LYMPH NODES:  Unremarkable as visualized.  No enlarged lymph nodes.     IMPRESSION:    Stable partially calcified pulmonary nodule left upper lobe measuring  1.3 cm.        This report was finalized on 8/6/2024 1:49 PM by Dr. Kaiser Carvajal MD.      PET/CT 10/31/24    FINDINGS:      HEAD:    BRAIN AND EXTRA-AXIAL SPACES:  Unremarkable as visualized.    NASOPHARYNX:  Unremarkable as visualized.      NECK:    HYPOPHARYNX:  Unremarkable as visualized.    LARYNX:  Unremarkable as visualized.    TRACHEA:  Unremarkable as visualized.    RETROPHARYNGEAL SPACE:  Unremarkable as visualized.    SUBMANDIBULAR/PAROTID GLANDS:  Unremarkable as visualized.  Glands are  normal in size.    THYROID:  Unremarkable as visualized.  No enlarged or calcified  nodules.      CHEST:    LUNGS AND PLEURAL SPACES:  Partially calcified 1.3 cm left upper lobe  pulmonary nodule shows no PET hypermetabolism at this time.  No  consolidation.  No significant effusion.  No pneumothorax.    HEART:  Unremarkable as visualized.  No cardiomegaly.  No significant  pericardial effusion.    MEDIASTINUM:  Unremarkable as visualized.  No mass.    "   ABDOMEN:    LIVER:  Unremarkable as visualized.    GALLBLADDER AND BILE DUCTS:  Unremarkable as visualized.  No calcified  stones.  No ductal dilation.    PANCREAS:  Unremarkable as visualized.  No ductal dilation.    SPLEEN:  Unremarkable as visualized.  No splenomegaly.    ADRENALS:  Unremarkable as visualized.  No mass.    KIDNEYS AND URETERS:  Unremarkable as visualized.    STOMACH AND BOWEL:  Unremarkable as visualized.  No obstruction.      PELVIS:    APPENDIX:  No findings to suggest acute appendicitis.    BLADDER:  Unremarkable as visualized.    REPRODUCTIVE:  Unremarkable as visualized.      WHOLE BODY:    INTRAPERITONEAL SPACE:  Unremarkable as visualized.  No significant  fluid collection.  No free air.    BONES/JOINTS:  Unremarkable as visualized.  No acute fracture.  No  dislocation.    SOFT TISSUES:  Unremarkable as visualized.    VASCULATURE:  Unremarkable as visualized.  No aortic aneurysm.    LYMPH NODES:  Unremarkable as visualized.  No lymphadenopathy.  No  hypermetabolic activity.     IMPRESSION:    Partially calcified 1.3 cm left upper lobe pulmonary nodule shows no  PET hypermetabolism at this time.        This report was finalized on 10/31/2024 10:23 AM by Dr. Kaiser Carvajal MD.      Alpha-1 antitrypsin screening:NA    STOP-Bang Score:   NA  Hankamer Sleepiness Scale:   NA      ABG:    pH No results found for: \"PHART\"   pO2 No results found for: \"PO2ART\"   pCO2 No results found for: \"SNF0EYI\"   HCO3 No results found for: \"QWC7FVM\"                      Assessment and Plan    Problem List Items Addressed This Visit    None  Visit Diagnoses       Pulmonary nodule    -  Primary    Relevant Orders    CT Chest Without Contrast            Macario Shukla  reports that he has never smoked. He has never been exposed to tobacco smoke. His smokeless tobacco use includes snuff.     Bronchoscopy was completed in September 2024, cytology and cultures were noted to be negative.    PET/CT was reviewed. No " hypermetabolic activity noted in pulmonary nodule in the left upper lobe.  Will plan to repeat CT Chest in February 2025.        Follow Up   Return in about 3 months (around 3/19/2025).  Patient was given instructions and counseling regarding his condition or for health maintenance advice. Please see specific information pulled into the AVS if appropriate.

## 2025-01-28 ENCOUNTER — APPOINTMENT (OUTPATIENT)
Dept: GENERAL RADIOLOGY | Facility: HOSPITAL | Age: 60
End: 2025-01-28
Payer: MEDICARE

## 2025-01-28 ENCOUNTER — HOSPITAL ENCOUNTER (EMERGENCY)
Facility: HOSPITAL | Age: 60
Discharge: HOME | End: 2025-01-28
Attending: STUDENT IN AN ORGANIZED HEALTH CARE EDUCATION/TRAINING PROGRAM | Admitting: STUDENT IN AN ORGANIZED HEALTH CARE EDUCATION/TRAINING PROGRAM
Payer: MEDICARE

## 2025-01-28 ENCOUNTER — APPOINTMENT (OUTPATIENT)
Dept: MRI IMAGING | Facility: HOSPITAL | Age: 60
End: 2025-01-28
Payer: MEDICARE

## 2025-01-28 VITALS
SYSTOLIC BLOOD PRESSURE: 118 MMHG | WEIGHT: 220 LBS | HEART RATE: 65 BPM | RESPIRATION RATE: 18 BRPM | TEMPERATURE: 98.3 F | DIASTOLIC BLOOD PRESSURE: 79 MMHG | OXYGEN SATURATION: 96 % | BODY MASS INDEX: 31.5 KG/M2 | HEIGHT: 70 IN

## 2025-01-28 DIAGNOSIS — M75.102 TEAR OF LEFT SUPRASPINATUS TENDON: Primary | ICD-10-CM

## 2025-01-28 DIAGNOSIS — M25.512 ACUTE PAIN OF LEFT SHOULDER: ICD-10-CM

## 2025-01-28 LAB
ALBUMIN SERPL-MCNC: 4 G/DL (ref 3.5–5.2)
ALBUMIN/GLOB SERPL: 1.4 G/DL
ALP SERPL-CCNC: 57 U/L (ref 39–117)
ALT SERPL W P-5'-P-CCNC: 20 U/L (ref 1–41)
ANION GAP SERPL CALCULATED.3IONS-SCNC: 10.2 MMOL/L (ref 5–15)
AST SERPL-CCNC: 18 U/L (ref 1–40)
BASOPHILS # BLD AUTO: 0.02 10*3/MM3 (ref 0–0.2)
BASOPHILS NFR BLD AUTO: 0.4 % (ref 0–1.5)
BILIRUB SERPL-MCNC: 1 MG/DL (ref 0–1.2)
BUN SERPL-MCNC: 19 MG/DL (ref 6–20)
BUN/CREAT SERPL: 26 (ref 7–25)
CALCIUM SPEC-SCNC: 8.9 MG/DL (ref 8.6–10.5)
CHLORIDE SERPL-SCNC: 102 MMOL/L (ref 98–107)
CO2 SERPL-SCNC: 24.8 MMOL/L (ref 22–29)
CREAT SERPL-MCNC: 0.73 MG/DL (ref 0.76–1.27)
DEPRECATED RDW RBC AUTO: 42.4 FL (ref 37–54)
EGFRCR SERPLBLD CKD-EPI 2021: 106.1 ML/MIN/1.73
EOSINOPHIL # BLD AUTO: 0.17 10*3/MM3 (ref 0–0.4)
EOSINOPHIL NFR BLD AUTO: 3.1 % (ref 0.3–6.2)
ERYTHROCYTE [DISTWIDTH] IN BLOOD BY AUTOMATED COUNT: 12.3 % (ref 12.3–15.4)
GLOBULIN UR ELPH-MCNC: 2.9 GM/DL
GLUCOSE SERPL-MCNC: 99 MG/DL (ref 65–99)
HCT VFR BLD AUTO: 42 % (ref 37.5–51)
HGB BLD-MCNC: 13.8 G/DL (ref 13–17.7)
HOLD SPECIMEN: NORMAL
HOLD SPECIMEN: NORMAL
IMM GRANULOCYTES # BLD AUTO: 0.01 10*3/MM3 (ref 0–0.05)
IMM GRANULOCYTES NFR BLD AUTO: 0.2 % (ref 0–0.5)
INR PPP: 1 (ref 0.9–1.1)
LYMPHOCYTES # BLD AUTO: 1.54 10*3/MM3 (ref 0.7–3.1)
LYMPHOCYTES NFR BLD AUTO: 27.9 % (ref 19.6–45.3)
MCH RBC QN AUTO: 30.5 PG (ref 26.6–33)
MCHC RBC AUTO-ENTMCNC: 32.9 G/DL (ref 31.5–35.7)
MCV RBC AUTO: 92.7 FL (ref 79–97)
MONOCYTES # BLD AUTO: 0.62 10*3/MM3 (ref 0.1–0.9)
MONOCYTES NFR BLD AUTO: 11.2 % (ref 5–12)
NEUTROPHILS NFR BLD AUTO: 3.16 10*3/MM3 (ref 1.7–7)
NEUTROPHILS NFR BLD AUTO: 57.2 % (ref 42.7–76)
NRBC BLD AUTO-RTO: 0 /100 WBC (ref 0–0.2)
NT-PROBNP SERPL-MCNC: <36 PG/ML (ref 0–900)
PLATELET # BLD AUTO: 234 10*3/MM3 (ref 140–450)
PMV BLD AUTO: 9.9 FL (ref 6–12)
POTASSIUM SERPL-SCNC: 3.9 MMOL/L (ref 3.5–5.2)
PROT SERPL-MCNC: 6.9 G/DL (ref 6–8.5)
PROTHROMBIN TIME: 13.3 SECONDS (ref 11.6–15.1)
QT INTERVAL: 454 MS
QTC INTERVAL: 434 MS
RBC # BLD AUTO: 4.53 10*6/MM3 (ref 4.14–5.8)
SODIUM SERPL-SCNC: 137 MMOL/L (ref 136–145)
TROPONIN T SERPL HS-MCNC: 7 NG/L
WBC NRBC COR # BLD AUTO: 5.52 10*3/MM3 (ref 3.4–10.8)
WHOLE BLOOD HOLD COAG: NORMAL
WHOLE BLOOD HOLD SPECIMEN: NORMAL

## 2025-01-28 PROCEDURE — 73221 MRI JOINT UPR EXTREM W/O DYE: CPT

## 2025-01-28 PROCEDURE — 73030 X-RAY EXAM OF SHOULDER: CPT

## 2025-01-28 PROCEDURE — 85610 PROTHROMBIN TIME: CPT | Performed by: STUDENT IN AN ORGANIZED HEALTH CARE EDUCATION/TRAINING PROGRAM

## 2025-01-28 PROCEDURE — 72141 MRI NECK SPINE W/O DYE: CPT

## 2025-01-28 PROCEDURE — 93005 ELECTROCARDIOGRAM TRACING: CPT | Performed by: STUDENT IN AN ORGANIZED HEALTH CARE EDUCATION/TRAINING PROGRAM

## 2025-01-28 PROCEDURE — 73030 X-RAY EXAM OF SHOULDER: CPT | Performed by: RADIOLOGY

## 2025-01-28 PROCEDURE — 83880 ASSAY OF NATRIURETIC PEPTIDE: CPT | Performed by: STUDENT IN AN ORGANIZED HEALTH CARE EDUCATION/TRAINING PROGRAM

## 2025-01-28 PROCEDURE — 80053 COMPREHEN METABOLIC PANEL: CPT | Performed by: STUDENT IN AN ORGANIZED HEALTH CARE EDUCATION/TRAINING PROGRAM

## 2025-01-28 PROCEDURE — 72141 MRI NECK SPINE W/O DYE: CPT | Performed by: RADIOLOGY

## 2025-01-28 PROCEDURE — 25010000002 TRIAMCINOLONE PER 10 MG: Performed by: STUDENT IN AN ORGANIZED HEALTH CARE EDUCATION/TRAINING PROGRAM

## 2025-01-28 PROCEDURE — 93010 ELECTROCARDIOGRAM REPORT: CPT | Performed by: INTERNAL MEDICINE

## 2025-01-28 PROCEDURE — 99284 EMERGENCY DEPT VISIT MOD MDM: CPT

## 2025-01-28 PROCEDURE — 84484 ASSAY OF TROPONIN QUANT: CPT | Performed by: STUDENT IN AN ORGANIZED HEALTH CARE EDUCATION/TRAINING PROGRAM

## 2025-01-28 PROCEDURE — 36415 COLL VENOUS BLD VENIPUNCTURE: CPT

## 2025-01-28 PROCEDURE — 73221 MRI JOINT UPR EXTREM W/O DYE: CPT | Performed by: RADIOLOGY

## 2025-01-28 PROCEDURE — 25010000002 DEXAMETHASONE PER 1 MG: Performed by: STUDENT IN AN ORGANIZED HEALTH CARE EDUCATION/TRAINING PROGRAM

## 2025-01-28 PROCEDURE — 71045 X-RAY EXAM CHEST 1 VIEW: CPT

## 2025-01-28 PROCEDURE — 71045 X-RAY EXAM CHEST 1 VIEW: CPT | Performed by: RADIOLOGY

## 2025-01-28 PROCEDURE — 25010000002 KETOROLAC TROMETHAMINE PER 15 MG: Performed by: STUDENT IN AN ORGANIZED HEALTH CARE EDUCATION/TRAINING PROGRAM

## 2025-01-28 PROCEDURE — 96372 THER/PROPH/DIAG INJ SC/IM: CPT

## 2025-01-28 PROCEDURE — 85025 COMPLETE CBC W/AUTO DIFF WBC: CPT | Performed by: STUDENT IN AN ORGANIZED HEALTH CARE EDUCATION/TRAINING PROGRAM

## 2025-01-28 RX ORDER — PREDNISONE 20 MG/1
40 TABLET ORAL DAILY
Qty: 10 TABLET | Refills: 0 | Status: SHIPPED | OUTPATIENT
Start: 2025-01-28 | End: 2025-02-02

## 2025-01-28 RX ORDER — TRIAMCINOLONE ACETONIDE 40 MG/ML
40 INJECTION, SUSPENSION INTRA-ARTICULAR; INTRAMUSCULAR ONCE
Status: COMPLETED | OUTPATIENT
Start: 2025-01-28 | End: 2025-01-28

## 2025-01-28 RX ORDER — IBUPROFEN 800 MG/1
800 TABLET, FILM COATED ORAL EVERY 8 HOURS PRN
Qty: 60 TABLET | Refills: 0 | Status: SHIPPED | OUTPATIENT
Start: 2025-01-28

## 2025-01-28 RX ORDER — KETOROLAC TROMETHAMINE 30 MG/ML
60 INJECTION, SOLUTION INTRAMUSCULAR; INTRAVENOUS ONCE
Status: COMPLETED | OUTPATIENT
Start: 2025-01-28 | End: 2025-01-28

## 2025-01-28 RX ORDER — OXYCODONE AND ACETAMINOPHEN 5; 325 MG/1; MG/1
1 TABLET ORAL EVERY 6 HOURS PRN
Qty: 12 TABLET | Refills: 0 | Status: SHIPPED | OUTPATIENT
Start: 2025-01-28

## 2025-01-28 RX ORDER — DEXAMETHASONE SODIUM PHOSPHATE 10 MG/ML
10 INJECTION INTRAMUSCULAR; INTRAVENOUS ONCE
Status: DISCONTINUED | OUTPATIENT
Start: 2025-01-28 | End: 2025-01-28

## 2025-01-28 RX ORDER — DIAZEPAM 5 MG/1
10 TABLET ORAL ONCE
Status: COMPLETED | OUTPATIENT
Start: 2025-01-28 | End: 2025-01-28

## 2025-01-28 RX ORDER — DEXAMETHASONE SODIUM PHOSPHATE 4 MG/ML
10 INJECTION, SOLUTION INTRA-ARTICULAR; INTRALESIONAL; INTRAMUSCULAR; INTRAVENOUS; SOFT TISSUE ONCE
Status: COMPLETED | OUTPATIENT
Start: 2025-01-28 | End: 2025-01-28

## 2025-01-28 RX ORDER — DEXAMETHASONE SODIUM PHOSPHATE 4 MG/ML
10 INJECTION, SOLUTION INTRA-ARTICULAR; INTRALESIONAL; INTRAMUSCULAR; INTRAVENOUS; SOFT TISSUE ONCE
Status: DISCONTINUED | OUTPATIENT
Start: 2025-01-28 | End: 2025-01-28

## 2025-01-28 RX ORDER — CYCLOBENZAPRINE HCL 10 MG
10 TABLET ORAL 3 TIMES DAILY PRN
Qty: 30 TABLET | Refills: 0 | Status: SHIPPED | OUTPATIENT
Start: 2025-01-28

## 2025-01-28 RX ORDER — CYCLOBENZAPRINE HCL 10 MG
10 TABLET ORAL ONCE
Status: COMPLETED | OUTPATIENT
Start: 2025-01-28 | End: 2025-01-28

## 2025-01-28 RX ADMIN — DEXAMETHASONE SODIUM PHOSPHATE 10 MG: 4 INJECTION, SOLUTION INTRA-ARTICULAR; INTRALESIONAL; INTRAMUSCULAR; INTRAVENOUS; SOFT TISSUE at 07:44

## 2025-01-28 RX ADMIN — KETOROLAC TROMETHAMINE 60 MG: 60 INJECTION, SOLUTION INTRAMUSCULAR at 06:52

## 2025-01-28 RX ADMIN — TRIAMCINOLONE ACETONIDE 40 MG: 40 INJECTION, SUSPENSION INTRA-ARTICULAR; INTRAMUSCULAR at 11:12

## 2025-01-28 RX ADMIN — DIAZEPAM 10 MG: 5 TABLET ORAL at 07:46

## 2025-01-28 RX ADMIN — CYCLOBENZAPRINE 10 MG: 10 TABLET, FILM COATED ORAL at 07:41

## 2025-01-28 NOTE — ED PROVIDER NOTES
Subjective   History of Present Illness  59-year-old male presents with complaints of shoulder pain.  Patient reports that he had a left shoulder injury several years ago however he had severe left shoulder pain that has progressively worsened despite taking his home [place] and came to the ER to be evaluated.  Patient reports several years ago he was told that he likely had a partial rotator cuff tear and predominantly has not had any type of limited motor function.    Over the past 24 to 48 hours patient started noticing tenderness on the superior aspect of the left shoulder with profoundly range of motion.  Patient woke up around 3:00 this morning and severe pain and is having difficulty independently moving his arm he states that he can use his right arm to help raise the arm to 90 degree angle patient prominently point localizes pain to the distal aspect of the clavicle on the top of the humerus.      Review of Systems    Past Medical History:   Diagnosis Date    Back injury     coal mining accident    Herniated cervical disc     Lung nodule     Migraine     Neuropathy     Pneumonia     Rheumatoid arthritis        No Known Allergies    Past Surgical History:   Procedure Laterality Date    BACK SURGERY      BRONCHOSCOPY N/A 9/25/2024    Procedure: BRONCHOSCOPY;  Surgeon: Daniel Bennett MD;  Location: [facility] OR;  Service: Pulmonary;  Laterality: N/A;    CARPAL TUNNEL RELEASE Right     COLONOSCOPY      ELBOW FUSION Left     hardware present    FRACTURE SURGERY      SPINAL FUSION         Family History   Problem Relation Age of Onset    Cancer Mother     Diabetes Mother     Cancer Father        Social History     Socioeconomic History    Marital status:    Tobacco Use    Smoking status: Never     Passive exposure: Never    Smokeless tobacco: Current     Types: Snuff    Tobacco comments:     Dip   Vaping Use    Vaping status: Never Used   Substance and Sexual Activity    Alcohol use: Not Currently    Drug  use: Never    Sexual activity: Yes     Partners: Female           Objective   Physical Exam    Procedures           ED Course  ED Course as of 02/01/25 0044   Tue Jan 28, 2025   0645 Patient states that he took his home Norco earlier prior to come to the ER which has provided minimal to no relief he does not want any additional acute pain medicine right now but is agreeable to a Toradol shot.  Patient is also agreeable for cardiac rule out given the severity and pain into the left shoulder currently he has no shortness of breath or chest pain at all he is palpable reproducible musculoskeletal tenderness at the distal acromioclavicular joint with limited active and passive range of motion    Has been ordered and pending at this time.  Electronically signed by Kimberlee Stewart DO, 01/28/25, 6:45 AM EST.   [LK]   1029 Supraspinatus tendinitis with a low-grade partial-thickness tear of the supraspinatus tendon [CW]   1110 Left shoulder injected with Kenalog/lidocaine intra-articularly.  DC with as needed NSAIDs, steroids follow-up PCP and Ortho [CW]      ED Course User Index  [CW] Rolando Snyder DO  [LK] Kimberlee Stewart DO                                                       Medical Decision Making  Problems Addressed:  Acute pain of left shoulder: complicated acute illness or injury  Tear of left supraspinatus tendon: complicated acute illness or injury    Amount and/or Complexity of Data Reviewed  Labs: ordered.  Radiology: ordered.  ECG/medicine tests: ordered.    Risk  Prescription drug management.        Final diagnoses:   Acute pain of left shoulder   Tear of left supraspinatus tendon       ED Disposition  ED Disposition       ED Disposition   Discharge    Condition   Stable    Comment   --               Dee Saleh April, APRN  [address] [facility] [place] [address] [phone]    In 1 week      Librado Munoz MD  [address] [place] [address] [phone]    Schedule  an appointment as soon as possible for a visit            Medication List        New Prescriptions      cyclobenzaprine 10 MG tablet  Commonly known as: FLEXERIL  Take 1 tablet by mouth 3 (Three) Times a Day As Needed for Muscle Spasms.     ibuprofen 800 MG tablet  Commonly known as: ADVIL,MOTRIN  Take 1 tablet by mouth Every 8 (Eight) Hours As Needed for Moderate Pain.     oxyCODONE-acetaminophen 5-325 MG per tablet  Commonly known as: PERCOCET  Take 1 tablet by mouth Every 6 (Six) Hours As Needed for Moderate Pain or Severe Pain.     predniSONE 20 MG tablet  Commonly known as: DELTASONE  Take 2 tablets by mouth Daily for 5 days.               Where to Get Your Medications        These medications were sent to [facility] [address] - [phone] [facility] [phone] [facility] [address] [facility] [address]      Phone: [phone]   cyclobenzaprine 10 MG tablet  ibuprofen 800 MG tablet  oxyCODONE-acetaminophen 5-325 MG per tablet  predniSONE 20 MG tablet            Kimberlee Stewart DO  01/28/25 0645       Kimberlee Stewart DO  02/01/25 0044

## 2025-02-11 ENCOUNTER — HOSPITAL ENCOUNTER (OUTPATIENT)
Dept: CT IMAGING | Facility: HOSPITAL | Age: 60
Discharge: HOME | End: 2025-02-11
Admitting: NURSE PRACTITIONER
Payer: MEDICARE

## 2025-02-11 DIAGNOSIS — R91.1 PULMONARY NODULE: ICD-10-CM

## 2025-02-11 PROCEDURE — 71250 CT THORAX DX C-: CPT

## 2025-02-12 PROCEDURE — 71250 CT THORAX DX C-: CPT | Performed by: RADIOLOGY

## 2025-02-14 DIAGNOSIS — R91.1 PULMONARY NODULE: Primary | ICD-10-CM

## 2025-02-14 NOTE — PROGRESS NOTES
CT Chest was discussed with the patient. Previously noted pulmonary nodule is noted to be stable.  We will plan to repeat CT Chest in 6 months.

## 2025-06-08 ENCOUNTER — HOSPITAL ENCOUNTER (OUTPATIENT)
Dept: GENERAL RADIOLOGY | Facility: HOSPITAL | Age: 60
Discharge: HOME | End: 2025-06-08
Payer: MEDICARE

## 2025-06-08 ENCOUNTER — TRANSCRIBE ORDERS (OUTPATIENT)
Dept: LAB | Facility: HOSPITAL | Age: 60
End: 2025-06-08
Payer: MEDICARE

## 2025-06-08 DIAGNOSIS — M25.529 ELBOW PAIN, UNSPECIFIED LATERALITY: ICD-10-CM

## 2025-06-08 DIAGNOSIS — M79.672 LEFT FOOT PAIN: ICD-10-CM

## 2025-06-08 DIAGNOSIS — M79.672 LEFT FOOT PAIN: Primary | ICD-10-CM

## 2025-06-08 PROCEDURE — 73630 X-RAY EXAM OF FOOT: CPT

## 2025-06-08 PROCEDURE — 73630 X-RAY EXAM OF FOOT: CPT | Performed by: RADIOLOGY

## 2025-06-08 PROCEDURE — 73080 X-RAY EXAM OF ELBOW: CPT

## 2025-06-08 PROCEDURE — 73080 X-RAY EXAM OF ELBOW: CPT | Performed by: RADIOLOGY

## (undated) DEVICE — SYR LUER SLPTP 50ML

## (undated) DEVICE — SUCTION CANISTER, 1500CC, RIGID: Brand: DEROYAL

## (undated) DEVICE — SINGLE USE BIOPSY VALVE MAJ-210: Brand: SINGLE USE BIOPSY VALVE (STERILE)

## (undated) DEVICE — Device

## (undated) DEVICE — TUBING, SUCTION, 3/16" X 6', STRAIGHT: Brand: MEDLINE

## (undated) DEVICE — SINGLE USE SUCTION VALVE MAJ-209: Brand: SINGLE USE SUCTION VALVE (STERILE)

## (undated) DEVICE — ADAPT SWVL FIBROPTIC BRONCH

## (undated) DEVICE — GOWN,REINF,POLY,ECL,PP SLV,XL: Brand: MEDLINE

## (undated) DEVICE — TUBING, SUCTION, 1/4" X 20', STRAIGHT: Brand: MEDLINE INDUSTRIES, INC.

## (undated) DEVICE — TRAP,MUCUS SPECIMEN,40CC: Brand: MEDLINE